# Patient Record
Sex: FEMALE | Race: WHITE
[De-identification: names, ages, dates, MRNs, and addresses within clinical notes are randomized per-mention and may not be internally consistent; named-entity substitution may affect disease eponyms.]

---

## 2018-05-25 ENCOUNTER — RESULT REVIEW (OUTPATIENT)
Age: 23
End: 2018-05-25

## 2019-02-14 ENCOUNTER — APPOINTMENT (OUTPATIENT)
Dept: CARDIOLOGY | Facility: CLINIC | Age: 24
End: 2019-02-14
Payer: COMMERCIAL

## 2019-02-14 ENCOUNTER — NON-APPOINTMENT (OUTPATIENT)
Age: 24
End: 2019-02-14

## 2019-02-14 VITALS
HEIGHT: 62.5 IN | BODY MASS INDEX: 22.79 KG/M2 | HEART RATE: 86 BPM | WEIGHT: 127 LBS | DIASTOLIC BLOOD PRESSURE: 72 MMHG | SYSTOLIC BLOOD PRESSURE: 118 MMHG | OXYGEN SATURATION: 99 %

## 2019-02-14 DIAGNOSIS — Z87.19 PERSONAL HISTORY OF OTHER DISEASES OF THE DIGESTIVE SYSTEM: ICD-10-CM

## 2019-02-14 DIAGNOSIS — R00.2 PALPITATIONS: ICD-10-CM

## 2019-02-14 DIAGNOSIS — Z87.09 PERSONAL HISTORY OF OTHER DISEASES OF THE RESPIRATORY SYSTEM: ICD-10-CM

## 2019-02-14 DIAGNOSIS — Z86.79 PERSONAL HISTORY OF OTHER DISEASES OF THE CIRCULATORY SYSTEM: ICD-10-CM

## 2019-02-14 DIAGNOSIS — Z87.898 PERSONAL HISTORY OF OTHER SPECIFIED CONDITIONS: ICD-10-CM

## 2019-02-14 DIAGNOSIS — Z78.9 OTHER SPECIFIED HEALTH STATUS: ICD-10-CM

## 2019-02-14 PROCEDURE — 99204 OFFICE O/P NEW MOD 45 MIN: CPT | Mod: 25

## 2019-02-14 PROCEDURE — 93000 ELECTROCARDIOGRAM COMPLETE: CPT

## 2019-02-14 RX ORDER — MULTIVITAMIN
TABLET ORAL DAILY
Refills: 0 | Status: ACTIVE | COMMUNITY

## 2019-02-14 NOTE — DISCUSSION/SUMMARY
[FreeTextEntry1] : Pt is a 22 y/o F who presents today with palpitations, tachycardia - ?SVT\par Todays ECG shows NSR without ST-T wave abnormalities - no ectopy\par Will check 30 day event monitor\par Will check transthoracic echocardiogram to evaluate left ventricular function and assess for any structural abnormalities\par will perform ETT to assess patient's current cardiac reserve to incremental activity and check for provocable ECG changes.\par VSS\par The described plan was discussed with the pt.  All questions and concerns were addressed to the best of my knowledge.

## 2019-02-14 NOTE — HISTORY OF PRESENT ILLNESS
[FreeTextEntry1] : Pt is a 24 y/o F who is referred here today by their PCP for evaluation.  She is a nursing student.  \par She is c/o palpitations - she gets high HR when she is at rest - up to 178.  At times feels as if her "HR speeds up then just stops".  She states that this all started summer 2017 when she was taken to the emergency department for "respiratory failure" - suddenly became SOB and passed out.  She was taken to [facility] ED- tells me that the etiology of all this was never discovered and she was discharged from the ED but during this her HR was elevated.  Since then she has noticed these episodes elevated HR that "come out of the blue".  Sometimes associated with chest discomfort.  She denies SOB, diaphoresis, dizziness, syncope, LE edema, PND. \par \par PCP [facility]\par PMH: asthma\par Smoking status: none\par social Etoh\par no drug use \par Current exercise: was regular but since symptoms has not been\par Daily water intake: 1 gallon\par Daily caffeine intake: 3-4 cups coffee\par OTC medications: MVI, probiotics, vit D\par Family hx: no immediate family hx of cardiac disease\par Previous cardiac testing: none\par Previous hospitalizations: respiratory distress, GIB\par no children\par LMP 01/2019

## 2019-02-14 NOTE — REVIEW OF SYSTEMS
[Negative] : Heme/Lymph [see HPI] : see HPI [Shortness Of Breath] : no shortness of breath [Dyspnea on exertion] : not dyspnea during exertion [Chest  Pressure] : no chest pressure [Chest Pain] : chest pain [Lower Ext Edema] : no extremity edema [Leg Claudication] : no intermittent leg claudication [Palpitations] : palpitations

## 2019-03-08 ENCOUNTER — RX CHANGE (OUTPATIENT)
Age: 24
End: 2019-03-08

## 2019-03-18 ENCOUNTER — APPOINTMENT (OUTPATIENT)
Dept: CARDIOLOGY | Facility: CLINIC | Age: 24
End: 2019-03-18
Payer: COMMERCIAL

## 2019-03-18 PROCEDURE — 93306 TTE W/DOPPLER COMPLETE: CPT

## 2019-03-18 PROCEDURE — 93015 CV STRESS TEST SUPVJ I&R: CPT

## 2019-07-08 ENCOUNTER — RX RENEWAL (OUTPATIENT)
Age: 24
End: 2019-07-08

## 2019-07-12 ENCOUNTER — RECORD ABSTRACTING (OUTPATIENT)
Age: 24
End: 2019-07-12

## 2019-07-12 DIAGNOSIS — G47.9 SLEEP DISORDER, UNSPECIFIED: ICD-10-CM

## 2019-07-12 DIAGNOSIS — F41.9 ANXIETY DISORDER, UNSPECIFIED: ICD-10-CM

## 2019-07-12 DIAGNOSIS — Z86.19 PERSONAL HISTORY OF OTHER INFECTIOUS AND PARASITIC DISEASES: ICD-10-CM

## 2019-07-12 DIAGNOSIS — Z80.3 FAMILY HISTORY OF MALIGNANT NEOPLASM OF BREAST: ICD-10-CM

## 2019-07-12 DIAGNOSIS — Z83.3 FAMILY HISTORY OF DIABETES MELLITUS: ICD-10-CM

## 2019-07-12 LAB — CYTOLOGY CVX/VAG DOC THIN PREP: NORMAL

## 2019-07-12 RX ORDER — ZOLPIDEM TARTRATE 5 MG/1
5 TABLET, FILM COATED ORAL
Refills: 0 | Status: ACTIVE | COMMUNITY

## 2019-07-13 ENCOUNTER — APPOINTMENT (OUTPATIENT)
Dept: OBGYN | Facility: CLINIC | Age: 24
End: 2019-07-13
Payer: COMMERCIAL

## 2019-07-13 ENCOUNTER — LABORATORY RESULT (OUTPATIENT)
Age: 24
End: 2019-07-13

## 2019-07-13 VITALS
DIASTOLIC BLOOD PRESSURE: 68 MMHG | HEIGHT: 62.5 IN | WEIGHT: 125 LBS | BODY MASS INDEX: 22.43 KG/M2 | SYSTOLIC BLOOD PRESSURE: 110 MMHG

## 2019-07-13 DIAGNOSIS — Q52.4 OTHER CONGENITAL MALFORMATIONS OF VAGINA: ICD-10-CM

## 2019-07-13 DIAGNOSIS — G44.89 OTHER HEADACHE SYNDROME: ICD-10-CM

## 2019-07-13 DIAGNOSIS — R07.89 OTHER CHEST PAIN: ICD-10-CM

## 2019-07-13 DIAGNOSIS — N89.6 TIGHT HYMENAL RING: ICD-10-CM

## 2019-07-13 DIAGNOSIS — Z00.00 ENCOUNTER FOR GENERAL ADULT MEDICAL EXAMINATION W/OUT ABNORMAL FINDINGS: ICD-10-CM

## 2019-07-13 LAB
BILIRUB UR QL STRIP: NORMAL
CLARITY UR: CLEAR
COLLECTION METHOD: NORMAL
GLUCOSE UR-MCNC: NORMAL
HCG UR QL: 0.2 EU/DL
HGB UR QL STRIP.AUTO: NORMAL
KETONES UR-MCNC: NORMAL
LEUKOCYTE ESTERASE UR QL STRIP: NORMAL
NITRITE UR QL STRIP: NORMAL
PH UR STRIP: 6
PROT UR STRIP-MCNC: NORMAL
SP GR UR STRIP: 1.02

## 2019-07-13 PROCEDURE — 81025 URINE PREGNANCY TEST: CPT

## 2019-07-13 PROCEDURE — 81003 URINALYSIS AUTO W/O SCOPE: CPT | Mod: QW

## 2019-07-13 PROCEDURE — 99395 PREV VISIT EST AGE 18-39: CPT

## 2019-07-13 NOTE — REVIEW OF SYSTEMS
[Feeling Tired] : feeling tired [Abdominal Pain] : abdominal pain [Headache] : headache [Vomiting] : vomiting [Sleep Disturbances] : sleep disturbances [Nl] : Endocrine [FreeTextEntry2] : neck pain

## 2019-07-13 NOTE — PHYSICAL EXAM
[Awake] : awake [Alert] : alert [Soft] : soft [Oriented x3] : oriented to person, place, and time [Labia Majora] : labia major [Labia Minora] : labia minora [Normal] : clitoris [No Bleeding] : there was no active vaginal bleeding [Uterine Adnexae] : were not tender and not enlarged [Acute Distress] : no acute distress [Nipple Discharge] : no nipple discharge [Mass] : no breast mass [Axillary LAD] : no axillary lymphadenopathy [Tender] : non tender [FreeTextEntry6] : normal

## 2019-07-13 NOTE — HISTORY OF PRESENT ILLNESS
[1 Year Ago] : 1 year ago [Good] : being in good health [Healthy Diet] : a healthy diet [Regular Exercise] : regular exercise [Last Pap ___] : Last cervical pap smear was [unfilled] [Menstrual Problems] : reports abnormal menses [Reproductive Age] : is of reproductive age [Menarche Age ____] : age at menarche was [unfilled] [Definite ___ (Date)] : the last menstrual period was [unfilled] [Oral Contraceptive] : uses oral contraception pills [Total Preg ___] : [unfilled] [Pregnancy History] : pregnancy history: [Abortions ___] : [unfilled] [Living ___] : [unfilled] [AB Induced ___] : elective abortions: [unfilled] [Last Screen ___] : last STD screen was [unfilled] [Irregular Menses] : irregular menses [Dyspareunia] : dyspareunia [Definite:  ___ (Date)] : the last menstrual period was [unfilled] [Menarche Age: ____] : age at menarche was [unfilled] [Sexually Active] : is sexually active [Monogamous] : is monogamous [Oral Contraceptives] : uses oral contraceptives [Contraception] : uses contraception [Male ___] : [unfilled] male [NA] : N/A [Weight Concerns] : no concerns with her weight

## 2019-07-13 NOTE — DISCUSSION/SUMMARY
[Condoms] : condom use [Combined Oral Contraception] : combined oral contraception [Pregnancy Prevention] : pregnancy prevention [FreeTextEntry1] : The R/B/C of OBC's reviewed, aware no menses due to hormones.  All the pt's questions and concerns were addressed.

## 2019-07-15 LAB
C TRACH RRNA SPEC QL NAA+PROBE: NOT DETECTED
N GONORRHOEA RRNA SPEC QL NAA+PROBE: NOT DETECTED
SOURCE AMPLIFICATION: NORMAL
SOURCE TP AMPLIFICATION: NORMAL
T VAGINALIS RRNA SPEC QL NAA+PROBE: NOT DETECTED

## 2019-07-18 DIAGNOSIS — N76.1 SUBACUTE AND CHRONIC VAGINITIS: ICD-10-CM

## 2019-07-18 LAB — CYTOLOGY CVX/VAG DOC THIN PREP: ABNORMAL

## 2019-07-31 ENCOUNTER — ASOB RESULT (OUTPATIENT)
Age: 24
End: 2019-07-31

## 2019-07-31 ENCOUNTER — APPOINTMENT (OUTPATIENT)
Dept: OBGYN | Facility: CLINIC | Age: 24
End: 2019-07-31
Payer: COMMERCIAL

## 2019-07-31 PROCEDURE — 76830 TRANSVAGINAL US NON-OB: CPT

## 2020-02-03 ENCOUNTER — APPOINTMENT (OUTPATIENT)
Dept: OBGYN | Facility: CLINIC | Age: 25
End: 2020-02-03

## 2020-02-08 ENCOUNTER — APPOINTMENT (OUTPATIENT)
Dept: OBGYN | Facility: CLINIC | Age: 25
End: 2020-02-08
Payer: COMMERCIAL

## 2020-02-08 VITALS
DIASTOLIC BLOOD PRESSURE: 58 MMHG | HEIGHT: 62 IN | SYSTOLIC BLOOD PRESSURE: 112 MMHG | WEIGHT: 135 LBS | BODY MASS INDEX: 24.84 KG/M2

## 2020-02-08 DIAGNOSIS — Z87.891 PERSONAL HISTORY OF NICOTINE DEPENDENCE: ICD-10-CM

## 2020-02-08 DIAGNOSIS — Z78.9 OTHER SPECIFIED HEALTH STATUS: ICD-10-CM

## 2020-02-08 DIAGNOSIS — R35.0 FREQUENCY OF MICTURITION: ICD-10-CM

## 2020-02-08 DIAGNOSIS — N89.8 OTHER SPECIFIED NONINFLAMMATORY DISORDERS OF VAGINA: ICD-10-CM

## 2020-02-08 DIAGNOSIS — Z82.62 FAMILY HISTORY OF OSTEOPOROSIS: ICD-10-CM

## 2020-02-08 DIAGNOSIS — R30.0 DYSURIA: ICD-10-CM

## 2020-02-08 DIAGNOSIS — Z30.9 ENCOUNTER FOR CONTRACEPTIVE MANAGEMENT, UNSPECIFIED: ICD-10-CM

## 2020-02-08 PROCEDURE — 81003 URINALYSIS AUTO W/O SCOPE: CPT | Mod: QW

## 2020-02-08 PROCEDURE — 99214 OFFICE O/P EST MOD 30 MIN: CPT

## 2020-02-08 RX ORDER — FLUCONAZOLE 150 MG/1
150 TABLET ORAL
Qty: 2 | Refills: 0 | Status: DISCONTINUED | COMMUNITY
Start: 2019-07-18 | End: 2020-02-08

## 2020-02-08 RX ORDER — NORETHINDRONE ACETATE AND ETHINYL ESTRADIOL AND FERROUS FUMARATE 1MG-20(21)
1-20 KIT ORAL
Qty: 3 | Refills: 0 | Status: DISCONTINUED | COMMUNITY
Start: 2019-03-08 | End: 2020-02-08

## 2020-02-08 RX ORDER — NORETHINDRONE ACETATE/ETHINYL ESTRADIOL AND FERROUS FUMARATE 1MG-20(21)
1-20 KIT ORAL
Qty: 3 | Refills: 3 | Status: DISCONTINUED | COMMUNITY
Start: 2019-07-13 | End: 2020-02-08

## 2020-02-08 NOTE — PHYSICAL EXAM
[Labia Majora] : labia major [Labia Minora] : labia minora [Normal] : clitoris [Discharge] : a  ~M vaginal discharge was present [Moderate] : moderate [White] : white

## 2020-02-08 NOTE — REVIEW OF SYSTEMS
[Heart Rate Is Fast] : fast heart rate [Urgency] : urgency [Frequency] : frequency [Burning Sensation] : burning sensation during urination [Nocturia] : nocturia [Sleep Disturbances] : sleep disturbances [Headache] : headache [Libido Decreased] : libido decreased [Nl] : Endocrine [FreeTextEntry1] : PMS, Vaginal Dryness, General poor feeling,  Vertigo, Rashes

## 2020-02-08 NOTE — HISTORY OF PRESENT ILLNESS
[Last Pap ___] : Last cervical pap smear was [unfilled] [Reproductive Age] : is of reproductive age [Oral Contraceptive] : uses oral contraception pills [Total Preg ___] : [unfilled] [Pregnancy History] : pregnancy history: [Living ___] : [unfilled] [AB Spont ___] : miscarriages: [unfilled] [AB Induced ___] : elective abortions: [unfilled] [Definite:  ___ (Date)] : the last menstrual period was [unfilled] [Sexually Active] : is sexually active [Menarche Age: ____] : age at menarche was [unfilled] [Monogamous] : is monogamous [Male ___] : [unfilled] male [Contraception] : uses contraception [Oral Contraceptives] : uses oral contraceptives [Menstrual Problems] : reports normal menses

## 2020-02-10 ENCOUNTER — TRANSCRIPTION ENCOUNTER (OUTPATIENT)
Age: 25
End: 2020-02-10

## 2020-02-10 DIAGNOSIS — B96.89 ACUTE VAGINITIS: ICD-10-CM

## 2020-02-10 DIAGNOSIS — N76.0 ACUTE VAGINITIS: ICD-10-CM

## 2020-02-10 LAB
C TRACH RRNA SPEC QL NAA+PROBE: NOT DETECTED
CANDIDA VAG CYTO: DETECTED
G VAGINALIS+PREV SP MTYP VAG QL MICRO: DETECTED
N GONORRHOEA RRNA SPEC QL NAA+PROBE: NOT DETECTED
SOURCE AMPLIFICATION: NORMAL
T VAGINALIS VAG QL WET PREP: NOT DETECTED

## 2020-03-29 LAB
BILIRUB UR QL STRIP: NORMAL
COLLECTION METHOD: NORMAL
GLUCOSE UR-MCNC: NORMAL
HCG UR QL: 0.2 EU/DL
HGB UR QL STRIP.AUTO: NORMAL
KETONES UR-MCNC: NORMAL
LEUKOCYTE ESTERASE UR QL STRIP: NORMAL
NITRITE UR QL STRIP: NORMAL
PH UR STRIP: 6.5
PROT UR STRIP-MCNC: NORMAL
SP GR UR STRIP: 1.01

## 2020-08-11 ENCOUNTER — RX RENEWAL (OUTPATIENT)
Age: 25
End: 2020-08-11

## 2020-09-05 ENCOUNTER — APPOINTMENT (OUTPATIENT)
Dept: OBGYN | Facility: CLINIC | Age: 25
End: 2020-09-05
Payer: COMMERCIAL

## 2020-09-05 VITALS
DIASTOLIC BLOOD PRESSURE: 62 MMHG | HEIGHT: 62 IN | TEMPERATURE: 97.5 F | BODY MASS INDEX: 24.84 KG/M2 | WEIGHT: 135 LBS | SYSTOLIC BLOOD PRESSURE: 108 MMHG

## 2020-09-05 DIAGNOSIS — Z01.419 ENCOUNTER FOR GYNECOLOGICAL EXAMINATION (GENERAL) (ROUTINE) W/OUT ABNORMAL FINDINGS: ICD-10-CM

## 2020-09-05 DIAGNOSIS — Z87.42 PERSONAL HISTORY OF OTHER DISEASES OF THE FEMALE GENITAL TRACT: ICD-10-CM

## 2020-09-05 DIAGNOSIS — Z87.898 PERSONAL HISTORY OF OTHER SPECIFIED CONDITIONS: ICD-10-CM

## 2020-09-05 DIAGNOSIS — Z30.09 ENCOUNTER FOR OTHER GENERAL COUNSELING AND ADVICE ON CONTRACEPTION: ICD-10-CM

## 2020-09-05 PROCEDURE — 99395 PREV VISIT EST AGE 18-39: CPT

## 2020-09-05 PROCEDURE — 36415 COLL VENOUS BLD VENIPUNCTURE: CPT

## 2020-09-05 RX ORDER — NORGESTIMATE AND ETHINYL ESTRADIOL 7DAYSX3 LO
0.18/0.215/0.25 KIT ORAL
Qty: 1 | Refills: 0 | Status: DISCONTINUED | COMMUNITY
Start: 2020-02-08 | End: 2020-09-05

## 2020-09-05 RX ORDER — METRONIDAZOLE 7.5 MG/G
0.75 GEL VAGINAL
Qty: 1 | Refills: 0 | Status: DISCONTINUED | COMMUNITY
Start: 2020-02-10 | End: 2020-09-05

## 2020-09-05 RX ORDER — FLUCONAZOLE 150 MG/1
150 TABLET ORAL
Qty: 2 | Refills: 0 | Status: DISCONTINUED | COMMUNITY
Start: 2020-02-10 | End: 2020-09-05

## 2020-09-05 NOTE — HISTORY OF PRESENT ILLNESS
[Last Pap ___] : Last cervical pap smear was [unfilled] [1 Year Ago] : 1 year ago [Definite:  ___ (Date)] : the last menstrual period was [unfilled] [Reproductive Age] : is of reproductive age [Sexually Active] : is sexually active [Menarche Age: ____] : age at menarche was [unfilled] [Oral Contraceptives] : uses oral contraceptives [Male ___] : [unfilled] male [Contraception] : uses contraception

## 2020-09-05 NOTE — COUNSELING
[Breast Self Exam] : breast self exam [Nutrition] : nutrition [Exercise] : exercise [Contraception] : contraception [Vitamins/Supplements] : vitamins/supplements [Safe Sexual Practices] : safe sexual practices

## 2020-09-07 LAB
HAV IGM SER QL: NONREACTIVE
HBV CORE IGM SER QL: NONREACTIVE
HBV SURFACE AG SER QL: NONREACTIVE
HCV AB SER QL: NONREACTIVE
HCV S/CO RATIO: 0.08 S/CO
HIV1+2 AB SPEC QL IA.RAPID: NONREACTIVE
T PALLIDUM AB SER QL IA: NEGATIVE

## 2020-09-07 NOTE — REVIEW OF SYSTEMS
[Palpitations] : palpitations [Sleep Disturbances] : sleep disturbances [Anxiety] : anxiety [Nl] : Psychiatric [Fever] : no fever [Chest Pain] : no chest pain [Chills] : no chills [Abdominal Pain] : no abdominal pain [Dyspnea] : no shortness of breath [Cough] : no cough [Vomiting] : no vomiting [Abn Vag Bleeding] : no abnormal vaginal bleeding [Constipation] : no constipation [Pelvic Pain] : no pelvic pain [Breast Pain] : no breast pain [Depression] : no depression [Headache] : no headache

## 2020-09-07 NOTE — PHYSICAL EXAM
[Awake] : awake [Alert] : alert [Soft] : soft [Oriented x3] : oriented to person, place, and time [Labia Majora] : labia major [Normal] : cervix [No Bleeding] : there was no active vaginal bleeding [Pap Obtained] : a Pap smear was performed [Normal Position] : in a normal position [Labia Minora] : labia minora [Uterine Adnexae] : were not tender and not enlarged [Acute Distress] : no acute distress [Thyroid Nodule] : no thyroid nodule [Mass] : no breast mass [LAD] : no lymphadenopathy [Goiter] : no goiter [Axillary LAD] : no axillary lymphadenopathy [Nipple Discharge] : no nipple discharge [Distended] : not distended [Depressed Mood] : not depressed [Flat Affect] : affect not flat [Tender] : non tender [Adnexa Tenderness] : were not tender [Tenderness] : nontender

## 2020-09-09 LAB
C TRACH RRNA SPEC QL NAA+PROBE: NOT DETECTED
N GONORRHOEA RRNA SPEC QL NAA+PROBE: NOT DETECTED
SOURCE TP AMPLIFICATION: NORMAL

## 2020-09-14 ENCOUNTER — TRANSCRIPTION ENCOUNTER (OUTPATIENT)
Age: 25
End: 2020-09-14

## 2020-09-14 LAB
CYTOLOGY CVX/VAG DOC THIN PREP: ABNORMAL
HPV HIGH+LOW RISK DNA PNL CVX: DETECTED

## 2020-10-02 ENCOUNTER — APPOINTMENT (OUTPATIENT)
Dept: OBGYN | Facility: CLINIC | Age: 25
End: 2020-10-02
Payer: COMMERCIAL

## 2020-10-02 DIAGNOSIS — R87.612 LOW GRADE SQUAMOUS INTRAEPITHELIAL LESION ON CYTOLOGIC SMEAR OF CERVIX (LGSIL): ICD-10-CM

## 2020-10-02 DIAGNOSIS — Z23 ENCOUNTER FOR IMMUNIZATION: ICD-10-CM

## 2020-10-02 LAB
HCG UR QL: NEGATIVE
QUALITY CONTROL: YES

## 2020-10-02 PROCEDURE — 57454 BX/CURETT OF CERVIX W/SCOPE: CPT

## 2020-10-02 PROCEDURE — 81025 URINE PREGNANCY TEST: CPT

## 2020-10-02 PROCEDURE — 90471 IMMUNIZATION ADMIN: CPT

## 2020-10-02 PROCEDURE — 90686 IIV4 VACC NO PRSV 0.5 ML IM: CPT

## 2020-10-04 NOTE — PROCEDURE
[Colposcopy] : Colposcopy  [Consent Obtained] : Consent obtained [Risks] : risks [Patient] : patient [LGSIL] : LGSIL [HPV High Risk] : HPV high risk [No Premedication] : no premedication [Colposcopy Adequate] : colposcopy adequate [SCI Fully Visualized] : SCI fully visualized [ECC Performed] : ECC performed [No Abnormalities] : no abnormalities [Lesion] : lesion seen [Biopsy] : biopsy taken [Hemostasis Obtained] : Hemostasis obtained [Tolerated Well] : the patient tolerated the procedure well [de-identified] : 1 [de-identified] : acetowhite at 6 o clock

## 2020-10-09 LAB — CORE LAB BIOPSY: NORMAL

## 2020-11-13 ENCOUNTER — APPOINTMENT (OUTPATIENT)
Dept: OBGYN | Facility: CLINIC | Age: 25
End: 2020-11-13

## 2020-11-13 ENCOUNTER — NON-APPOINTMENT (OUTPATIENT)
Age: 25
End: 2020-11-13

## 2020-11-17 ENCOUNTER — APPOINTMENT (OUTPATIENT)
Dept: OBGYN | Facility: CLINIC | Age: 25
End: 2020-11-17

## 2020-12-02 ENCOUNTER — APPOINTMENT (OUTPATIENT)
Dept: OBGYN | Facility: CLINIC | Age: 25
End: 2020-12-02

## 2020-12-23 PROBLEM — Z01.419 ENCOUNTER FOR ANNUAL ROUTINE GYNECOLOGICAL EXAMINATION: Status: RESOLVED | Noted: 2019-07-13 | Resolved: 2020-12-23

## 2020-12-23 PROBLEM — N76.0 BACTERIAL VAGINOSIS: Status: RESOLVED | Noted: 2020-02-10 | Resolved: 2020-12-23

## 2021-05-19 ENCOUNTER — APPOINTMENT (OUTPATIENT)
Dept: OBGYN | Facility: CLINIC | Age: 26
End: 2021-05-19
Payer: COMMERCIAL

## 2021-05-19 ENCOUNTER — RESULT CHARGE (OUTPATIENT)
Age: 26
End: 2021-05-19

## 2021-05-19 VITALS
HEIGHT: 62 IN | SYSTOLIC BLOOD PRESSURE: 102 MMHG | BODY MASS INDEX: 27.97 KG/M2 | DIASTOLIC BLOOD PRESSURE: 58 MMHG | TEMPERATURE: 97.7 F | WEIGHT: 152 LBS

## 2021-05-19 DIAGNOSIS — R10.2 PELVIC AND PERINEAL PAIN: ICD-10-CM

## 2021-05-19 LAB
BILIRUB UR QL STRIP: NORMAL
GLUCOSE UR-MCNC: NORMAL
HCG UR QL: 0.2 EU/DL
HCG UR QL: NEGATIVE
HGB UR QL STRIP.AUTO: NORMAL
KETONES UR-MCNC: NORMAL
LEUKOCYTE ESTERASE UR QL STRIP: ABNORMAL
NITRITE UR QL STRIP: NORMAL
PH UR STRIP: 5.5
PROT UR STRIP-MCNC: NORMAL
QUALITY CONTROL: YES
SP GR UR STRIP: 1.02

## 2021-05-19 PROCEDURE — 81025 URINE PREGNANCY TEST: CPT

## 2021-05-19 PROCEDURE — 99072 ADDL SUPL MATRL&STAF TM PHE: CPT

## 2021-05-19 PROCEDURE — 81003 URINALYSIS AUTO W/O SCOPE: CPT | Mod: QW

## 2021-05-19 PROCEDURE — 99213 OFFICE O/P EST LOW 20 MIN: CPT

## 2021-05-19 NOTE — PLAN
[FreeTextEntry1] : - differentials for pain reviewed- recommend pelvic US- will return for US and MD consult same day\par - has f/u at [facility] 7/8 for surveillance\par - check cultures - tx based on results\par - pelvic rest for now.

## 2021-05-19 NOTE — HISTORY OF PRESENT ILLNESS
[Condoms] : uses condoms [Y] : Patient is sexually active [Monogamous (Male Partner)] : is monogamous with a male partner [N] : Patient denies prior pregnancies [Menarche Age: ____] : age at menarche was [unfilled] [Currently Active] : currently active [Men] : men [Vaginal] : vaginal [TextBox_4] : Denice is here for evaluation of new onset 'stabbing' pelvic pain with deep penetrating positions with intercourse.  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  She is with the same partner.\par \par She reports during deep penetrating positions she has had very sharp stabbing pain which changes to a cramping sensation- mainly to her LLQ.\par \par She denies n/v/d/fever or chills.\par \par She denies vaginal discharge, odor, dysuria. \par \par  [PapSmeardate] : 9/5/2020 [TextBox_31] : LSIL [HPVDate] : 9/5/2020 [TextBox_78] : POSITIVE [LMPDate] : 4/20/2020 [PGHxAbortions] : 1 [PGHxTotal] : 2 [PGHxABSpont] : 1 [FreeTextEntry1] : 4/20/2020

## 2021-05-19 NOTE — PHYSICAL EXAM
[Appropriately responsive] : appropriately responsive [Alert] : alert [No Acute Distress] : no acute distress [Soft] : soft [Non-tender] : non-tender [Non-distended] : non-distended [No Lesions] : no lesions [No Mass] : no mass [Oriented x3] : oriented x3 [Labia Majora] : normal [Labia Minora] : normal [Discharge] : a  ~M vaginal discharge was present [Moderate] : moderate [Rolly] : yellow [Thick] : thick [Normal] : normal [Uterine Adnexae] : normal [FreeTextEntry5] : no CMT

## 2021-05-21 LAB
BACTERIA UR CULT: NORMAL
C TRACH RRNA SPEC QL NAA+PROBE: NOT DETECTED
N GONORRHOEA RRNA SPEC QL NAA+PROBE: NOT DETECTED
SOURCE AMPLIFICATION: NORMAL

## 2021-05-28 ENCOUNTER — APPOINTMENT (OUTPATIENT)
Dept: OBGYN | Facility: CLINIC | Age: 26
End: 2021-05-28
Payer: COMMERCIAL

## 2021-05-28 ENCOUNTER — ASOB RESULT (OUTPATIENT)
Age: 26
End: 2021-05-28

## 2021-05-28 VITALS
WEIGHT: 152 LBS | HEIGHT: 62 IN | DIASTOLIC BLOOD PRESSURE: 56 MMHG | TEMPERATURE: 97.1 F | SYSTOLIC BLOOD PRESSURE: 94 MMHG | BODY MASS INDEX: 27.97 KG/M2

## 2021-05-28 DIAGNOSIS — N94.10 UNSPECIFIED DYSPAREUNIA: ICD-10-CM

## 2021-05-28 DIAGNOSIS — K59.00 CONSTIPATION, UNSPECIFIED: ICD-10-CM

## 2021-05-28 PROCEDURE — 76830 TRANSVAGINAL US NON-OB: CPT

## 2021-05-28 PROCEDURE — 99072 ADDL SUPL MATRL&STAF TM PHE: CPT

## 2021-05-28 PROCEDURE — 99214 OFFICE O/P EST MOD 30 MIN: CPT | Mod: 25

## 2021-05-28 RX ORDER — NORETHINDRONE ACETATE AND ETHINYL ESTRADIOL AND FERROUS FUMARATE 1.5-30(21)
1.5-3 KIT ORAL
Qty: 84 | Refills: 3 | Status: DISCONTINUED | COMMUNITY
Start: 2020-09-05 | End: 2021-05-28

## 2021-05-31 PROBLEM — N94.10 DYSPAREUNIA, FEMALE: Status: ACTIVE | Noted: 2021-05-19

## 2021-05-31 PROBLEM — K59.00 CONSTIPATION: Status: ACTIVE | Noted: 2021-05-31

## 2021-05-31 NOTE — DISCUSSION/SUMMARY
[FreeTextEntry1] : 25 y/o\par \par 5/28/21 US: anteverted uterus 7 cm. EMS 2 mm. Adnexa normal. No FF. \par \par #Dyspareunia\par #constipation\par #hx of CKC\par -Reviewed pelvic US today, normal\par -intermittent dyspareunia is positional and not likely related to CKC; recommend position changes\par -more likely related bowel; recommend bowel regime\par -con't care at [facility]\par \par \par -Patient will return in 10/2021 for gyn annual exam.  \par

## 2021-05-31 NOTE — HISTORY OF PRESENT ILLNESS
[N] : Patient reports normal menses [Oral Contraceptive] : uses oral contraception pills [Y] : Positive pregnancy history [Menarche Age: ____] : age at menarche was [unfilled] [Currently Active] : currently active [Men] : men [Vaginal] : vaginal [No] : No [Patient refuses STI testing] : Patient refuses STI testing [PapSmeardate] : 9/5/20 [TextBox_4] : pt states coming in for follow up to review sono [TextBox_31] : lsil [HIVDate] : 9/5/20 [TextBox_53] : neg [SyphilisDate] : 9/5/20 [TextBox_58] : neg [GonorrheaDate] : 5/19/21 [TextBox_63] : neg [ChlamydiaDate] : 5/19/21 [HPVDate] : 9/5/20 [TextBox_68] : neg [TextBox_78] : pos [HepatitisBDate] : 9/5/20 [TextBox_83] : neg [HepatitisCDate] : 9/5/20 [TextBox_88] : neg [LMPDate] : 5/18/21 [PGHxTotal] : 2 [[facility] CenterxLiving] : 0 [PGHxABInduced] : 1 [PGHxABSpont] : 1 [FreeTextEntry1] : 5/18/21

## 2021-05-31 NOTE — PHYSICAL EXAM
[Appropriately responsive] : appropriately responsive [Alert] : alert [No Acute Distress] : no acute distress [Soft] : soft [No Lesions] : no lesions [Oriented x3] : oriented x3

## 2021-05-31 NOTE — END OF VISIT
[FreeTextEntry3] : I, Diane edwards acted as scribe for Dr. Denia Mckeon on 05/28/2021. All medical entries made by the Scribe were at my, Dr. Mckeon's, direction and personally dictated by me on 05/28/2021. I have reviewed the chart and agree that the record accurately reflects my personal performance of the history, physical exam, assessment, and plan. I have also personally directed, reviewed, and agreed with the chart.

## 2021-08-07 ENCOUNTER — EMERGENCY (EMERGENCY)
Facility: HOSPITAL | Age: 26
LOS: 0 days | Discharge: HOME | End: 2021-08-07
Attending: EMERGENCY MEDICINE
Payer: COMMERCIAL

## 2021-08-07 VITALS
RESPIRATION RATE: 20 BRPM | SYSTOLIC BLOOD PRESSURE: 123 MMHG | DIASTOLIC BLOOD PRESSURE: 71 MMHG | OXYGEN SATURATION: 100 % | TEMPERATURE: 100 F | HEART RATE: 99 BPM

## 2021-08-07 VITALS — WEIGHT: 126.1 LBS

## 2021-08-07 DIAGNOSIS — R07.9 CHEST PAIN, UNSPECIFIED: ICD-10-CM

## 2021-08-07 DIAGNOSIS — R09.81 NASAL CONGESTION: ICD-10-CM

## 2021-08-07 DIAGNOSIS — J06.9 ACUTE UPPER RESPIRATORY INFECTION, UNSPECIFIED: ICD-10-CM

## 2021-08-07 DIAGNOSIS — Z85.41 PERSONAL HISTORY OF MALIGNANT NEOPLASM OF CERVIX UTERI: ICD-10-CM

## 2021-08-07 DIAGNOSIS — M79.10 MYALGIA, UNSPECIFIED SITE: ICD-10-CM

## 2021-08-07 DIAGNOSIS — B97.89 OTHER VIRAL AGENTS AS THE CAUSE OF DISEASES CLASSIFIED ELSEWHERE: ICD-10-CM

## 2021-08-07 DIAGNOSIS — R51.9 HEADACHE, UNSPECIFIED: ICD-10-CM

## 2021-08-07 DIAGNOSIS — Z86.59 PERSONAL HISTORY OF OTHER MENTAL AND BEHAVIORAL DISORDERS: ICD-10-CM

## 2021-08-07 DIAGNOSIS — R19.7 DIARRHEA, UNSPECIFIED: ICD-10-CM

## 2021-08-07 DIAGNOSIS — Z87.09 PERSONAL HISTORY OF OTHER DISEASES OF THE RESPIRATORY SYSTEM: ICD-10-CM

## 2021-08-07 DIAGNOSIS — R05 COUGH: ICD-10-CM

## 2021-08-07 DIAGNOSIS — Z79.82 LONG TERM (CURRENT) USE OF ASPIRIN: ICD-10-CM

## 2021-08-07 LAB
RAPID RVP RESULT: DETECTED
SARS-COV-2 RNA SPEC QL NAA+PROBE: DETECTED

## 2021-08-07 PROCEDURE — 99283 EMERGENCY DEPT VISIT LOW MDM: CPT

## 2021-08-07 PROCEDURE — 99284 EMERGENCY DEPT VISIT MOD MDM: CPT

## 2021-08-07 PROCEDURE — 0225U NFCT DS DNA&RNA 21 SARSCOV2: CPT

## 2021-08-07 RX ORDER — IBUPROFEN 200 MG
400 TABLET ORAL ONCE
Refills: 0 | Status: COMPLETED | OUTPATIENT
Start: 2021-08-07 | End: 2021-08-07

## 2021-08-07 RX ORDER — ACETAMINOPHEN 500 MG
650 TABLET ORAL ONCE
Refills: 0 | Status: DISCONTINUED | OUTPATIENT
Start: 2021-08-07 | End: 2021-08-07

## 2021-08-07 RX ADMIN — Medication 100 MILLIGRAM(S): at 22:27

## 2021-08-07 RX ADMIN — Medication 400 MILLIGRAM(S): at 22:27

## 2021-08-07 NOTE — ED ADULT TRIAGE NOTE - CHIEF COMPLAINT QUOTE
c/o cough, chest congestion for past 3 days, fever Tmax 100.3, has been taking advil, c/o sick contact from work, patient works as an ER nurse, patient has not taken covid vaccine HX; Respiratory failure at age 21

## 2021-08-07 NOTE — ED PROVIDER NOTE - OBJECTIVE STATEMENT
Patient was provided a ham sandwich at this time       Darcy Rodriguez RN  12/02/19 [id number] Pt. is a 27 yo F with a PMHx of tachycardia and respiratory failure following albuterol administration at age 21, hx of depression and cervical cancer diagnosed in 2020 presents with nasal congestion X 1 day, dry cough X 4 days, burning in throat, headache, and body aches X 4 days.  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  Patient denies fever.  Did not take any meds prior to arrival.  Patient has slight chest tightness and congestion today.  Went to urgent care center and had Rapid Covid swab which was negative.  Takes birth control and anti-depressant.  Denies leg pain, leg swelling, tick bite, rash, vomiting.  +loose stools which is baseline for her.  Patient has not received Covid vaccine.

## 2021-08-07 NOTE — ED ADULT NURSE NOTE - OBJECTIVE STATEMENT
Pt ambulatory to ED for COVID swab with +exposure from work. Pt presents to ED with congestion x1 day, cough x4 days, headache and sensation of throat burning. Pt has not taken any medication for fevers. Pt had rapid COVID test done at urgent care which was negative.

## 2021-08-07 NOTE — ED PROVIDER NOTE - PMH
Depression, unspecified depression type    History of respiratory failure    Malignant neoplasm of cervix, unspecified site

## 2021-08-07 NOTE — ED PROVIDER NOTE - NSFOLLOWUPINSTRUCTIONS_ED_ALL_ED_FT
Take over the counter Dimetapp or Robitussin and take as directed on box.     Use tylenol or ibuprofen 2 tabs every 6 hours for headache or body aches.      Results of your testing should be back within 24 hours.    Viral Respiratory Infection  A viral respiratory infection is an illness that affects parts of the body used for breathing, like the lungs, nose, and throat. It is caused by a germ called a virus.    ImageSome examples of this kind of infection are:    A cold.  The flu (influenza).  A respiratory syncytial virus (RSV) infection.    How do I know if I have this infection?  Most of the time this infection causes:    A stuffy or runny nose.  Yellow or green fluid in the nose.  A cough.  Sneezing.  Tiredness (fatigue).  Achy muscles.  A sore throat.  Sweating or chills.  A fever.  A headache.    How is this infection treated?  If the flu is diagnosed early, it may be treated with an antiviral medicine. This medicine shortens the length of time a person has symptoms. Symptoms may be treated with over-the-counter and prescription medicines, such as:    Expectorants. These make it easier to cough up mucus.  Decongestant nasal sprays.    Doctors do not prescribe antibiotic medicines for viral infections. They do not work with this kind of infection.    How do I know if I should stay home?  To keep others from getting sick, stay home if you have:    A fever.  A lasting cough.  A sore throat.  A runny nose.  Sneezing.  Muscles aches.  Headaches.  Tiredness.  Weakness.  Chills.  Sweating.  An upset stomach (nausea).    Follow these instructions at home:  Rest as much as possible.  Take over-the-counter and prescription medicines only as told by your doctor.  Drink enough fluid to keep your pee (urine) clear or pale yellow.  Gargle with salt water. Do this 3–4 times per day or as needed. To make a salt–water mixture, dissolve ½–1 tsp of salt in 1 cup of warm water. Make sure the salt dissolves all the way.  Use nose drops made from salt water. This helps with stuffiness (congestion). It also helps soften the skin around your nose.  Do not drink alcohol.  Do not use tobacco products, including cigarettes, chewing tobacco, and e-cigarettes. If you need help quitting, ask your doctor.  Get help if:  Your symptoms last for 10 days or longer.  Your symptoms get worse over time.  You have a fever.  You have very bad pain in your face or forehead.  Parts of your jaw or neck become very swollen.  Get help right away if:  You feel pain or pressure in your chest.  You have shortness of breath.  You faint or feel like you will faint.  You keep throwing up (vomiting).  You feel confused.  This information is not intended to replace advice given to you by your health care provider. Make sure you discuss any questions you have with your health care provider.

## 2021-08-07 NOTE — ED PROVIDER NOTE - PATIENT PORTAL LINK FT
You can access the FollowMyHealth Patient Portal offered by [facility] by registering at the following website: [email or web address]. By joining [facility]’s FollowMyHealth portal, you will also be able to view your health information using other applications (apps) compatible with our system.

## 2021-08-07 NOTE — ED PROVIDER NOTE - CLINICAL SUMMARY MEDICAL DECISION MAKING FREE TEXT BOX
Viral URI.  RVP/Covid testing (PCR) sent.      Tylenol for headache and body aches, oral hydration, and tessalon for cough given in ED.

## 2021-08-09 NOTE — ED POST DISCHARGE NOTE - DETAILS
COVID positive.  Unable to leave message.  Jerson KHAN COVID positive.  Unable to leave message. Letter sent. ~Donavon Tariq PA-C

## 2021-08-13 ENCOUNTER — INPATIENT (INPATIENT)
Facility: HOSPITAL | Age: 26
LOS: 1 days | Discharge: HOME | DRG: 178 | End: 2021-08-15
Attending: FAMILY MEDICINE | Admitting: INTERNAL MEDICINE
Payer: COMMERCIAL

## 2021-08-13 VITALS — HEART RATE: 136 BPM | RESPIRATION RATE: 25 BRPM | OXYGEN SATURATION: 100 %

## 2021-08-13 LAB
ALBUMIN SERPL ELPH-MCNC: 4.2 G/DL — SIGNIFICANT CHANGE UP (ref 3.3–5)
ALP SERPL-CCNC: 83 U/L — SIGNIFICANT CHANGE UP (ref 40–120)
ALT FLD-CCNC: 60 U/L — SIGNIFICANT CHANGE UP (ref 12–78)
ANION GAP SERPL CALC-SCNC: 12 MMOL/L — SIGNIFICANT CHANGE UP (ref 5–17)
AST SERPL-CCNC: 46 U/L — HIGH (ref 15–37)
BASOPHILS # BLD AUTO: 0.03 K/UL — SIGNIFICANT CHANGE UP (ref 0–0.2)
BASOPHILS NFR BLD AUTO: 0.4 % — SIGNIFICANT CHANGE UP (ref 0–2)
BILIRUB SERPL-MCNC: 0.5 MG/DL — SIGNIFICANT CHANGE UP (ref 0.2–1.2)
BUN SERPL-MCNC: 5 MG/DL — LOW (ref 7–23)
CALCIUM SERPL-MCNC: 9.7 MG/DL — SIGNIFICANT CHANGE UP (ref 8.5–10.1)
CHLORIDE SERPL-SCNC: 111 MMOL/L — HIGH (ref 96–108)
CO2 SERPL-SCNC: 18 MMOL/L — LOW (ref 22–31)
CREAT SERPL-MCNC: 0.9 MG/DL — SIGNIFICANT CHANGE UP (ref 0.5–1.3)
EOSINOPHIL # BLD AUTO: 0.06 K/UL — SIGNIFICANT CHANGE UP (ref 0–0.5)
EOSINOPHIL NFR BLD AUTO: 0.8 % — SIGNIFICANT CHANGE UP (ref 0–6)
GLUCOSE SERPL-MCNC: 89 MG/DL — SIGNIFICANT CHANGE UP (ref 70–99)
HCT VFR BLD CALC: 38.8 % — SIGNIFICANT CHANGE UP (ref 34.5–45)
HGB BLD-MCNC: 13.5 G/DL — SIGNIFICANT CHANGE UP (ref 11.5–15.5)
IMM GRANULOCYTES NFR BLD AUTO: 0.3 % — SIGNIFICANT CHANGE UP (ref 0–1.5)
INR BLD: 1 RATIO — SIGNIFICANT CHANGE UP (ref 0.88–1.16)
LYMPHOCYTES # BLD AUTO: 3.18 K/UL — SIGNIFICANT CHANGE UP (ref 1–3.3)
LYMPHOCYTES # BLD AUTO: 42.5 % — SIGNIFICANT CHANGE UP (ref 13–44)
MCHC RBC-ENTMCNC: 31.1 PG — SIGNIFICANT CHANGE UP (ref 27–34)
MCHC RBC-ENTMCNC: 34.8 GM/DL — SIGNIFICANT CHANGE UP (ref 32–36)
MCV RBC AUTO: 89.4 FL — SIGNIFICANT CHANGE UP (ref 80–100)
MONOCYTES # BLD AUTO: 0.58 K/UL — SIGNIFICANT CHANGE UP (ref 0–0.9)
MONOCYTES NFR BLD AUTO: 7.7 % — SIGNIFICANT CHANGE UP (ref 2–14)
NEUTROPHILS # BLD AUTO: 3.62 K/UL — SIGNIFICANT CHANGE UP (ref 1.8–7.4)
NEUTROPHILS NFR BLD AUTO: 48.3 % — SIGNIFICANT CHANGE UP (ref 43–77)
NT-PROBNP SERPL-SCNC: 79 PG/ML — SIGNIFICANT CHANGE UP (ref 0–125)
PLATELET # BLD AUTO: 284 K/UL — SIGNIFICANT CHANGE UP (ref 150–400)
POTASSIUM SERPL-MCNC: 4.3 MMOL/L — SIGNIFICANT CHANGE UP (ref 3.5–5.3)
POTASSIUM SERPL-SCNC: 4.3 MMOL/L — SIGNIFICANT CHANGE UP (ref 3.5–5.3)
PROT SERPL-MCNC: 8.2 GM/DL — SIGNIFICANT CHANGE UP (ref 6–8.3)
PROTHROM AB SERPL-ACNC: 11.6 SEC — SIGNIFICANT CHANGE UP (ref 10.6–13.6)
RBC # BLD: 4.34 M/UL — SIGNIFICANT CHANGE UP (ref 3.8–5.2)
RBC # FLD: 12.4 % — SIGNIFICANT CHANGE UP (ref 10.3–14.5)
SODIUM SERPL-SCNC: 141 MMOL/L — SIGNIFICANT CHANGE UP (ref 135–145)
TROPONIN I SERPL-MCNC: <0.015 NG/ML — SIGNIFICANT CHANGE UP (ref 0.01–0.04)
WBC # BLD: 7.49 K/UL — SIGNIFICANT CHANGE UP (ref 3.8–10.5)
WBC # FLD AUTO: 7.49 K/UL — SIGNIFICANT CHANGE UP (ref 3.8–10.5)

## 2021-08-13 PROCEDURE — 99285 EMERGENCY DEPT VISIT HI MDM: CPT

## 2021-08-13 PROCEDURE — 93010 ELECTROCARDIOGRAM REPORT: CPT

## 2021-08-13 RX ORDER — ONDANSETRON 8 MG/1
4 TABLET, FILM COATED ORAL ONCE
Refills: 0 | Status: COMPLETED | OUTPATIENT
Start: 2021-08-13 | End: 2021-08-13

## 2021-08-13 RX ORDER — REMDESIVIR 5 MG/ML
200 INJECTION INTRAVENOUS EVERY 24 HOURS
Refills: 0 | Status: COMPLETED | OUTPATIENT
Start: 2021-08-13 | End: 2021-08-13

## 2021-08-13 RX ORDER — SODIUM CHLORIDE 9 MG/ML
1000 INJECTION INTRAMUSCULAR; INTRAVENOUS; SUBCUTANEOUS ONCE
Refills: 0 | Status: COMPLETED | OUTPATIENT
Start: 2021-08-13 | End: 2021-08-13

## 2021-08-13 RX ORDER — MORPHINE SULFATE 50 MG/1
4 CAPSULE, EXTENDED RELEASE ORAL ONCE
Refills: 0 | Status: DISCONTINUED | OUTPATIENT
Start: 2021-08-13 | End: 2021-08-13

## 2021-08-13 RX ORDER — REMDESIVIR 5 MG/ML
100 INJECTION INTRAVENOUS EVERY 24 HOURS
Refills: 0 | Status: DISCONTINUED | OUTPATIENT
Start: 2021-08-14 | End: 2021-08-14

## 2021-08-13 RX ORDER — MAGNESIUM SULFATE 500 MG/ML
2 VIAL (ML) INJECTION ONCE
Refills: 0 | Status: COMPLETED | OUTPATIENT
Start: 2021-08-13 | End: 2021-08-13

## 2021-08-13 RX ORDER — REMDESIVIR 5 MG/ML
INJECTION INTRAVENOUS
Refills: 0 | Status: DISCONTINUED | OUTPATIENT
Start: 2021-08-13 | End: 2021-08-14

## 2021-08-13 RX ADMIN — MORPHINE SULFATE 4 MILLIGRAM(S): 50 CAPSULE, EXTENDED RELEASE ORAL at 23:29

## 2021-08-13 RX ADMIN — Medication 125 MILLIGRAM(S): at 22:45

## 2021-08-13 RX ADMIN — Medication 50 GRAM(S): at 22:45

## 2021-08-13 RX ADMIN — MORPHINE SULFATE 4 MILLIGRAM(S): 50 CAPSULE, EXTENDED RELEASE ORAL at 23:59

## 2021-08-13 RX ADMIN — ONDANSETRON 4 MILLIGRAM(S): 8 TABLET, FILM COATED ORAL at 23:43

## 2021-08-13 RX ADMIN — SODIUM CHLORIDE 1000 MILLILITER(S): 9 INJECTION INTRAMUSCULAR; INTRAVENOUS; SUBCUTANEOUS at 23:05

## 2021-08-13 RX ADMIN — REMDESIVIR 580 MILLIGRAM(S): 5 INJECTION INTRAVENOUS at 23:30

## 2021-08-13 NOTE — ED ADULT NURSE NOTE - OBJECTIVE STATEMENT
Pt A&Ox4, presents to the ED c/o SOB and cough. Pt COVID positive on 8/7. Pt reports symptoms starting last week on 8/4; fever, blurred vision, fatigue, loss of taste and smell. Pt states fever broke last night. Pt noted to be diaphoretic, tachypneic, afebrile, and tachycardic upon assessment, coughing uncontrollably. Unable to obtain accurate BP due to coughing. Pt reports 35lb weight loss recently. Denies CP.

## 2021-08-13 NOTE — ED PROVIDER NOTE - OBJECTIVE STATEMENT
25 y/o female in ED c/o worsening sob and cough x 1 day.   pt states recently diagnosed with COVID.   states not vaccinated.    positive sick contacts at work with COVID.  pt states also with cp with coughing.    pt denies any v/d/abd pain.   tolerating PO,

## 2021-08-13 NOTE — ED ADULT TRIAGE NOTE - CHIEF COMPLAINT QUOTE
Pt presents to ED for coughing, shortness of breath. pt is COVID positive. Pt presents to ED for shortness of breath, coughing. unable to speak in complete sentences. pt is COVID positive 8/7.

## 2021-08-13 NOTE — ED PROVIDER NOTE - NSICDXPASTMEDICALHX_GEN_ALL_CORE_FT
PAST MEDICAL HISTORY:  Depression, unspecified depression type     History of respiratory failure     Malignant neoplasm of cervix, unspecified site

## 2021-08-13 NOTE — ED ADULT NURSE NOTE - CHIEF COMPLAINT QUOTE
Pt presents to ED for shortness of breath, coughing. unable to speak in complete sentences. pt is COVID positive 8/7.

## 2021-08-13 NOTE — ED PROVIDER NOTE - CARE PLAN
1 Principal Discharge DX:	2019 novel coronavirus disease (COVID-19)  Secondary Diagnosis:	Shortness of breath

## 2021-08-14 ENCOUNTER — RESULT REVIEW (OUTPATIENT)
Age: 26
End: 2021-08-14

## 2021-08-14 DIAGNOSIS — U07.1 COVID-19: ICD-10-CM

## 2021-08-14 PROBLEM — F32.9 MAJOR DEPRESSIVE DISORDER, SINGLE EPISODE, UNSPECIFIED: Chronic | Status: ACTIVE | Noted: 2021-08-07

## 2021-08-14 PROBLEM — C53.9 MALIGNANT NEOPLASM OF CERVIX UTERI, UNSPECIFIED: Chronic | Status: ACTIVE | Noted: 2021-08-07

## 2021-08-14 PROBLEM — Z87.09 PERSONAL HISTORY OF OTHER DISEASES OF THE RESPIRATORY SYSTEM: Chronic | Status: ACTIVE | Noted: 2021-08-07

## 2021-08-14 LAB
A1C WITH ESTIMATED AVERAGE GLUCOSE RESULT: 5.1 % — SIGNIFICANT CHANGE UP (ref 4–5.6)
ALBUMIN SERPL ELPH-MCNC: 3.7 G/DL — SIGNIFICANT CHANGE UP (ref 3.3–5)
ALBUMIN SERPL ELPH-MCNC: 3.7 G/DL — SIGNIFICANT CHANGE UP (ref 3.3–5)
ALP SERPL-CCNC: 75 U/L — SIGNIFICANT CHANGE UP (ref 40–120)
ALP SERPL-CCNC: 75 U/L — SIGNIFICANT CHANGE UP (ref 40–120)
ALT FLD-CCNC: 54 U/L — SIGNIFICANT CHANGE UP (ref 12–78)
ALT FLD-CCNC: 57 U/L — SIGNIFICANT CHANGE UP (ref 12–78)
ANION GAP SERPL CALC-SCNC: 6 MMOL/L — SIGNIFICANT CHANGE UP (ref 5–17)
AST SERPL-CCNC: 44 U/L — HIGH (ref 15–37)
AST SERPL-CCNC: 44 U/L — HIGH (ref 15–37)
BASE EXCESS BLDA CALC-SCNC: -4.9 MMOL/L — LOW (ref -2–2)
BILIRUB DIRECT SERPL-MCNC: 0.1 MG/DL — SIGNIFICANT CHANGE UP (ref 0–0.2)
BILIRUB INDIRECT FLD-MCNC: 0.4 MG/DL — SIGNIFICANT CHANGE UP (ref 0.2–1)
BILIRUB SERPL-MCNC: 0.4 MG/DL — SIGNIFICANT CHANGE UP (ref 0.2–1.2)
BILIRUB SERPL-MCNC: 0.5 MG/DL — SIGNIFICANT CHANGE UP (ref 0.2–1.2)
BLOOD GAS COMMENTS ARTERIAL: SIGNIFICANT CHANGE UP
BUN SERPL-MCNC: 6 MG/DL — LOW (ref 7–23)
CALCIUM SERPL-MCNC: 8.3 MG/DL — LOW (ref 8.5–10.1)
CHLORIDE SERPL-SCNC: 112 MMOL/L — HIGH (ref 96–108)
CK SERPL-CCNC: 76 U/L — SIGNIFICANT CHANGE UP (ref 26–192)
CO2 SERPL-SCNC: 21 MMOL/L — LOW (ref 22–31)
COVID-19 SPIKE DOMAIN AB INTERP: NEGATIVE — SIGNIFICANT CHANGE UP
COVID-19 SPIKE DOMAIN ANTIBODY RESULT: 0.4 U/ML — SIGNIFICANT CHANGE UP
CREAT SERPL-MCNC: 0.54 MG/DL — SIGNIFICANT CHANGE UP (ref 0.5–1.3)
CREAT SERPL-MCNC: 0.78 MG/DL — SIGNIFICANT CHANGE UP (ref 0.5–1.3)
CRP SERPL-MCNC: 12 MG/L — HIGH
D DIMER BLD IA.RAPID-MCNC: 319 NG/ML DDU — HIGH
ESTIMATED AVERAGE GLUCOSE: 100 MG/DL — SIGNIFICANT CHANGE UP (ref 68–114)
FERRITIN SERPL-MCNC: 254 NG/ML — HIGH (ref 15–150)
GAS PNL BLDA: SIGNIFICANT CHANGE UP
GLUCOSE SERPL-MCNC: 138 MG/DL — HIGH (ref 70–99)
HCO3 BLDA-SCNC: 13 MMOL/L — LOW (ref 21–29)
HCT VFR BLD CALC: 33.9 % — LOW (ref 34.5–45)
HGB BLD-MCNC: 11.8 G/DL — SIGNIFICANT CHANGE UP (ref 11.5–15.5)
INR BLD: 0.99 RATIO — SIGNIFICANT CHANGE UP (ref 0.88–1.16)
LACTATE SERPL-SCNC: 1.2 MMOL/L — SIGNIFICANT CHANGE UP (ref 0.7–2)
LDH SERPL L TO P-CCNC: 213 U/L — SIGNIFICANT CHANGE UP (ref 84–241)
MAGNESIUM SERPL-MCNC: 2.5 MG/DL — SIGNIFICANT CHANGE UP (ref 1.6–2.6)
MCHC RBC-ENTMCNC: 31.5 PG — SIGNIFICANT CHANGE UP (ref 27–34)
MCHC RBC-ENTMCNC: 34.8 GM/DL — SIGNIFICANT CHANGE UP (ref 32–36)
MCV RBC AUTO: 90.4 FL — SIGNIFICANT CHANGE UP (ref 80–100)
PCO2 BLDA: 12 MMHG — LOW (ref 32–46)
PH BLDA: 7.67 — CRITICAL HIGH (ref 7.35–7.45)
PHOSPHATE SERPL-MCNC: 2.7 MG/DL — SIGNIFICANT CHANGE UP (ref 2.5–4.5)
PLATELET # BLD AUTO: 216 K/UL — SIGNIFICANT CHANGE UP (ref 150–400)
PO2 BLDA: 156 MMHG — HIGH (ref 74–108)
POTASSIUM SERPL-MCNC: 4 MMOL/L — SIGNIFICANT CHANGE UP (ref 3.5–5.3)
POTASSIUM SERPL-SCNC: 4 MMOL/L — SIGNIFICANT CHANGE UP (ref 3.5–5.3)
PROCALCITONIN SERPL-MCNC: 0.03 NG/ML — SIGNIFICANT CHANGE UP (ref 0.02–0.1)
PROT SERPL-MCNC: 7.3 GM/DL — SIGNIFICANT CHANGE UP (ref 6–8.3)
PROT SERPL-MCNC: 7.3 GM/DL — SIGNIFICANT CHANGE UP (ref 6–8.3)
PROTHROM AB SERPL-ACNC: 11.5 SEC — SIGNIFICANT CHANGE UP (ref 10.6–13.6)
RBC # BLD: 3.75 M/UL — LOW (ref 3.8–5.2)
RBC # FLD: 12.5 % — SIGNIFICANT CHANGE UP (ref 10.3–14.5)
SAO2 % BLDA: 99 % — HIGH (ref 92–96)
SARS-COV-2 IGG+IGM SERPL QL IA: 0.4 U/ML — SIGNIFICANT CHANGE UP
SARS-COV-2 IGG+IGM SERPL QL IA: NEGATIVE — SIGNIFICANT CHANGE UP
SODIUM SERPL-SCNC: 139 MMOL/L — SIGNIFICANT CHANGE UP (ref 135–145)
TROPONIN I SERPL-MCNC: <0.015 NG/ML — SIGNIFICANT CHANGE UP (ref 0.01–0.04)
WBC # BLD: 3.36 K/UL — LOW (ref 3.8–10.5)
WBC # FLD AUTO: 3.36 K/UL — LOW (ref 3.8–10.5)

## 2021-08-14 PROCEDURE — 71275 CT ANGIOGRAPHY CHEST: CPT | Mod: 26

## 2021-08-14 PROCEDURE — 85379 FIBRIN DEGRADATION QUANT: CPT

## 2021-08-14 PROCEDURE — 84100 ASSAY OF PHOSPHORUS: CPT

## 2021-08-14 PROCEDURE — 83605 ASSAY OF LACTIC ACID: CPT

## 2021-08-14 PROCEDURE — 71275 CT ANGIOGRAPHY CHEST: CPT

## 2021-08-14 PROCEDURE — 82728 ASSAY OF FERRITIN: CPT

## 2021-08-14 PROCEDURE — 80053 COMPREHEN METABOLIC PANEL: CPT

## 2021-08-14 PROCEDURE — 88305 TISSUE EXAM BY PATHOLOGIST: CPT

## 2021-08-14 PROCEDURE — 83615 LACTATE (LD) (LDH) ENZYME: CPT

## 2021-08-14 PROCEDURE — 36415 COLL VENOUS BLD VENIPUNCTURE: CPT

## 2021-08-14 PROCEDURE — 82550 ASSAY OF CK (CPK): CPT

## 2021-08-14 PROCEDURE — 12345: CPT | Mod: NC,GC

## 2021-08-14 PROCEDURE — 99222 1ST HOSP IP/OBS MODERATE 55: CPT | Mod: GC

## 2021-08-14 PROCEDURE — 86769 SARS-COV-2 COVID-19 ANTIBODY: CPT

## 2021-08-14 PROCEDURE — 84484 ASSAY OF TROPONIN QUANT: CPT

## 2021-08-14 PROCEDURE — 85027 COMPLETE CBC AUTOMATED: CPT

## 2021-08-14 PROCEDURE — 93010 ELECTROCARDIOGRAM REPORT: CPT

## 2021-08-14 PROCEDURE — 84145 PROCALCITONIN (PCT): CPT

## 2021-08-14 PROCEDURE — 83735 ASSAY OF MAGNESIUM: CPT

## 2021-08-14 PROCEDURE — 71045 X-RAY EXAM CHEST 1 VIEW: CPT | Mod: 26

## 2021-08-14 PROCEDURE — 86140 C-REACTIVE PROTEIN: CPT

## 2021-08-14 PROCEDURE — 83036 HEMOGLOBIN GLYCOSYLATED A1C: CPT

## 2021-08-14 PROCEDURE — 88305 TISSUE EXAM BY PATHOLOGIST: CPT | Mod: 26

## 2021-08-14 RX ORDER — ENOXAPARIN SODIUM 100 MG/ML
40 INJECTION SUBCUTANEOUS
Refills: 0 | Status: DISCONTINUED | OUTPATIENT
Start: 2021-08-14 | End: 2021-08-14

## 2021-08-14 RX ORDER — ALPRAZOLAM 0.25 MG
0.25 TABLET ORAL DAILY
Refills: 0 | Status: DISCONTINUED | OUTPATIENT
Start: 2021-08-14 | End: 2021-08-15

## 2021-08-14 RX ORDER — BUPROPION HYDROCHLORIDE 150 MG/1
150 TABLET, EXTENDED RELEASE ORAL DAILY
Refills: 0 | Status: DISCONTINUED | OUTPATIENT
Start: 2021-08-14 | End: 2021-08-15

## 2021-08-14 RX ORDER — ONDANSETRON 8 MG/1
4 TABLET, FILM COATED ORAL EVERY 6 HOURS
Refills: 0 | Status: DISCONTINUED | OUTPATIENT
Start: 2021-08-14 | End: 2021-08-15

## 2021-08-14 RX ORDER — SODIUM CHLORIDE 9 MG/ML
1000 INJECTION INTRAMUSCULAR; INTRAVENOUS; SUBCUTANEOUS
Refills: 0 | Status: DISCONTINUED | OUTPATIENT
Start: 2021-08-14 | End: 2021-08-15

## 2021-08-14 RX ORDER — ENOXAPARIN SODIUM 100 MG/ML
40 INJECTION SUBCUTANEOUS DAILY
Refills: 0 | Status: DISCONTINUED | OUTPATIENT
Start: 2021-08-14 | End: 2021-08-14

## 2021-08-14 RX ORDER — ZOLPIDEM TARTRATE 10 MG/1
5 TABLET ORAL AT BEDTIME
Refills: 0 | Status: DISCONTINUED | OUTPATIENT
Start: 2021-08-14 | End: 2021-08-15

## 2021-08-14 RX ORDER — ACETAMINOPHEN 500 MG
650 TABLET ORAL EVERY 4 HOURS
Refills: 0 | Status: DISCONTINUED | OUTPATIENT
Start: 2021-08-14 | End: 2021-08-15

## 2021-08-14 RX ORDER — ASPIRIN/CALCIUM CARB/MAGNESIUM 324 MG
81 TABLET ORAL DAILY
Refills: 0 | Status: DISCONTINUED | OUTPATIENT
Start: 2021-08-14 | End: 2021-08-15

## 2021-08-14 RX ORDER — ENOXAPARIN SODIUM 100 MG/ML
40 INJECTION SUBCUTANEOUS DAILY
Refills: 0 | Status: DISCONTINUED | OUTPATIENT
Start: 2021-08-14 | End: 2021-08-15

## 2021-08-14 RX ADMIN — Medication 0.25 MILLIGRAM(S): at 20:01

## 2021-08-14 RX ADMIN — BUPROPION HYDROCHLORIDE 150 MILLIGRAM(S): 150 TABLET, EXTENDED RELEASE ORAL at 11:58

## 2021-08-14 RX ADMIN — Medication 1 TABLET(S): at 11:57

## 2021-08-14 RX ADMIN — SODIUM CHLORIDE 75 MILLILITER(S): 9 INJECTION INTRAMUSCULAR; INTRAVENOUS; SUBCUTANEOUS at 14:14

## 2021-08-14 RX ADMIN — Medication 100 MILLIGRAM(S): at 05:22

## 2021-08-14 RX ADMIN — ENOXAPARIN SODIUM 40 MILLIGRAM(S): 100 INJECTION SUBCUTANEOUS at 11:57

## 2021-08-14 RX ADMIN — ZOLPIDEM TARTRATE 5 MILLIGRAM(S): 10 TABLET ORAL at 22:26

## 2021-08-14 RX ADMIN — Medication 100 MILLIGRAM(S): at 14:07

## 2021-08-14 RX ADMIN — Medication 81 MILLIGRAM(S): at 11:56

## 2021-08-14 RX ADMIN — Medication 100 MILLIGRAM(S): at 21:07

## 2021-08-14 NOTE — ED ADULT NURSE REASSESSMENT NOTE - NS ED NURSE REASSESS COMMENT FT1
Pt A&Ox4, resting comfortably. Denies any pain. VSS, afebrile. Respirations equal and unlabored. Dry cough noted. Morning medication administered. Report given to [address] [place] [address]. Pt pending transport. Pt updated on plan of care, verbalized understanding.

## 2021-08-14 NOTE — CONSULT NOTE ADULT - ASSESSMENT
26F with a PMH significant for Cervical Cancer and ALECIA/MDD admitted on 8/14 for evaluation of cough and shortness of breath, fever and chills; was diagnosed with COVID-19 infection about one week ago. On the "advice" of her oncologist she never was vaccinated. Has known sick contact with COVID-19 infection. Has not required any supplemental oxygen and saturation is 100% on room air.   1. Patient is admitted with COVID-19 infection, however is not hypoxic, has cough, but is in midst of discontinuing smoking  - follow up cultures   - serial cbc and monitor temperature   - reviewed prior medical records to evaluate for resistant or atypical pathogens   - no role for steroids or remdesivir at this time, should she require supplemental oxygen, then reconsider  - suggest discharge home  2. other issues: per medicine 26F with a PMH significant for Cervical Cancer and ALECIA/MDD admitted on 8/14 for evaluation of cough and shortness of breath, fever and chills; was diagnosed with COVID-19 infection about one week ago. On the "advice" of her oncologist she never was vaccinated. Has known sick contact with COVID-19 infection. Has not required any supplemental oxygen and saturation is 100% on room air.   1. Patient is admitted with COVID-19 infection, however is not hypoxic, has cough, but is in midst of discontinuing smoking  - follow up cultures   - leukopenia secondary to viral infection  - serial cbc and monitor temperature   - reviewed prior medical records to evaluate for resistant or atypical pathogens   - no role for steroids or remdesivir at this time, should she require supplemental oxygen, then reconsider  - suggest discharge home  2. other issues: per medicine

## 2021-08-14 NOTE — PROGRESS NOTE ADULT - SUBJECTIVE AND OBJECTIVE BOX
HPI  26F with a PMH significant for Cervical Cancer and ALECIA/MDD who presents [facility] with progressive shortness of breath. She was recently diagnosed with COVID on Saturday night, and was quarantining while treating herself with conservative management for symptomatic relief. At that time she experienced fevers, chills with severe Myalgias. She is an active smoker of over 2 years and recently quit smoking a week prior where her sxs started. She began experiencing progressive difficulties with her respiration as well as associated pleuritic chest pain, which concerned her to report to the ED for further managment. She endorses one positive contact at her employment and states that she is currently unvaccinated.     Subjective  No acute distress. Vitals stable. Pt states she feels better today though still has some residual coughing and minimal sputum production. Denies SOB, difficulty breathing, chest pain.      REVIEW OF SYSTEMS:  CONSTITUTIONAL: No weakness, fevers or chills  EYES/ENT: No visual changes;  No vertigo or throat pain   NECK: No pain or stiffness  RESPIRATORY: No cough, wheezing, hemoptysis; No shortness of breath  CARDIOVASCULAR: No chest pain or palpitations  GASTROINTESTINAL: No abdominal or epigastric pain. No nausea, vomiting, or hematemesis; No diarrhea or constipation. No melena or hematochezia.  GENITOURINARY: No dysuria, frequency or hematuria  NEUROLOGICAL: No numbness or weakness  SKIN: No itching, burning, rashes, or lesions   All other review of systems is negative unless indicated above    PHYSICAL EXAM:  Vital Signs Last 24 Hrs  T(C): 36.7 (14 Aug 2021 09:02), Max: 37.3 (14 Aug 2021 06:53)  T(F): 98.1 (14 Aug 2021 09:02), Max: 99.2 (14 Aug 2021 06:53)  HR: 68 (14 Aug 2021 09:02) (68 - 136)  BP: 107/57 (14 Aug 2021 09:02) (98/73 - 140/75)  BP(mean): 92 (14 Aug 2021 05:22) (73 - 107)  RR: 19 (14 Aug 2021 09:02) (18 - 28)  SpO2: 100% (14 Aug 2021 09:02) (95% - 100%)    Constitutional: Pt lying in bed, awake and alert, NAD  HEENT: EOMI, normal hearing, moist mucous membranes  Neck: Soft and supple, no JVD  Respiratory: CTABL, No wheezing, rales or rhonchi  Cardiovascular: S1S2+, RRR, no M/G/R  Gastrointestinal: BS+, soft, NT/ND, no guarding, no rebound  Extremities: No peripheral edema  Vascular: 2+ peripheral pulses  Neurological: AAOx3, no focal deficits  Musculoskeletal: 5/5 strength b/l upper and lower extremities  Skin: No rashes    MEDICATIONS:  MEDICATIONS  (STANDING):  aspirin  chewable 81 milliGRAM(s) Oral daily  benzonatate 100 milliGRAM(s) Oral three times a day  buPROPion XL (24-Hour) . 150 milliGRAM(s) Oral daily  enoxaparin Injectable 40 milliGRAM(s) SubCutaneous daily  hydrocodone/homatropine Syrup 5 milliLiter(s) Oral three times a day  multivitamin 1 Tablet(s) Oral daily  remdesivir  IVPB 100 milliGRAM(s) IV Intermittent every 24 hours  remdesivir  IVPB   IV Intermittent   sodium chloride 0.9%. 1000 milliLiter(s) (75 mL/Hr) IV Continuous <Continuous>      LABS: All Labs Reviewed:                        11.8   3.36  )-----------( 216      ( 14 Aug 2021 08:05 )             33.9     08-14    139  |  112<H>  |  6<L>  ----------------------------<  138<H>  4.0   |  21<L>  |  0.54    Ca    8.3<L>      14 Aug 2021 08:05  Phos  2.7     08-14  Mg     2.5     08-14    TPro  7.3  /  Alb  3.7  /  TBili  0.4  /  DBili  x   /  AST  44<H>  /  ALT  57  /  AlkPhos  75  08-14    PT/INR - ( 14 Aug 2021 01:21 )   PT: 11.5 sec;   INR: 0.99 ratio           CARDIAC MARKERS ( 14 Aug 2021 08:05 )  <0.015 ng/mL / x     / 76 U/L / x     / x      CARDIAC MARKERS ( 13 Aug 2021 22:35 )  <0.015 ng/mL / x     / x     / x     / x          Blood Culture:   I&O's Summary    CAPILLARY BLOOD GLUCOSE          RADIOLOGY/EKG:

## 2021-08-14 NOTE — H&P ADULT - NSHPLABSRESULTS_GEN_ALL_CORE
Labs                 13.5   7.49  )-----------( 284      ( 13 Aug 2021 22:35 )             38.8     14 Aug 2021 01:21    x      |  x      |  x      ----------------------------<  x      x       |  x      |  0.78     Ca    9.7        13 Aug 2021 22:35    TPro  7.3    /  Alb  3.7    /  TBili  0.5    /  DBili  0.1    /  AST  44     /  ALT  54     /  AlkPhos  75     14 Aug 2021 01:21    LIVER FUNCTIONS - ( 14 Aug 2021 01:21 )  Alb: 3.7 g/dL / Pro: 7.3 gm/dL / ALK PHOS: 75 U/L / ALT: 54 U/L / AST: 44 U/L / GGT: x           PT/INR - ( 14 Aug 2021 01:21 )   PT: 11.5 sec;   INR: 0.99 ratio           CAPILLARY BLOOD GLUCOSE        CARDIAC MARKERS ( 13 Aug 2021 22:35 )  <0.015 ng/mL / x     / x     / x     / x

## 2021-08-14 NOTE — H&P ADULT - ASSESSMENT
26F with a PMH most significant for MDD and Cervical Cancer. She presented to the [facility] with progressive SOB and associated Chest pain. In the ED he was found to be afebrile with HR in the 140's and RR in the 40's sating in the high 90's on RA. Physical exam was remarkable for mild respiratory distress. Laboratory findings were significant for Respiratory Alkalosis on ABG with COVID PCR detected. CXR and EKG were unremarkable. Pt was initiated on IV steroids with Remdisuvir, then admitted to the floor with Acute Respiratory Alkalosis in the setting of COVID Viral Syndrome with ID consulted.      #Acute Respiratory Alkalosis in the setting of COVID Viral Syndrome  -CXR: displayed no acute pathology   -(+) COVID-19 PCR: Detected   -Continue with Supplemental Oxygen PRN should she desat below 91% on RA  -Continue IV Methylprednisolone   -Continue Tylenol 650mg q6hrs PRN for fever  -Continue to monitor for fevers or worsening saturations   -S/p initial dose of Remdisuvir in the ED, pt may not meet criteria to continue   -Consult ID for further management of the above     #MDD  -Pt currently denies active Suicidal Ideations or Plans   -Pt denies current use of antidepressants   -Continue to follow up with PMD as out pt    #Cervical Cancer   -Continue to follow up with PMD as out pt    #Advance Directives  -Full Code    -Pt with capacity         #DVT PPX  -Continue with Lovenox 40mg Sub Q      Case d/w Attending       26F with a PMH most significant for MDD and Cervical Cancer. She presented to the [facility] with progressive SOB and associated Chest pain. In the ED he was found to be afebrile with HR in the 140's and RR in the 40's sating in the high 90's on RA. Physical exam was remarkable for mild respiratory distress. Laboratory findings were significant for Respiratory Alkalosis on ABG with COVID PCR detected. CXR and EKG were unremarkable. Pt was initiated on IV steroids with Remdisuvir, then admitted to the floor with Acute Respiratory Alkalosis in the setting of COVID Viral Syndrome with ID consulted.      #Acute Respiratory Alkalosis in the setting of COVID Viral Syndrome  -CXR: displayed no acute pathology   -(+) COVID-19 PCR: Detected   -Continue with Supplemental Oxygen PRN should she desat below 91% on RA  -Continue IV Methylprednisolone   -Continue Tylenol 650mg q6hrs PRN for fever  -Continue to monitor for fevers or worsening saturations   -S/p initial dose of Remdisuvir in the ED, pt may not meet criteria to continue   -Consult ID for further management of the above     #MDD/ALECIA  -Pt currently denies active Suicidal Ideations or Plans   -Continue with Wellbutrin 150mg qD  -Continue with Xanax PRN 0.25mg  -Continue to follow up with PMD as out pt    #Insomnia   -Continue with Ambien 5mg qHs   -May double up to 10mg qHs in refractory cases, as prescribed out pt     #Cervical Cancer   -S/p surgical resection   -Continue to follow up with PMD as out pt  -Continue with ASA 81mg qD       #Advance Directives  -Full Code    -Pt with capacity         #DVT PPX  -Continue with Lovenox 40mg Sub Q      Case to be d/w Dr. Wadsworth  26F with a PMH most significant for MDD and Cervical Cancer. She presented to the [facility] with progressive SOB and associated Chest pain. In the ED he was found to be afebrile with HR in the 140's and RR in the 40's sating in the high 90's on RA. Physical exam was remarkable for mild respiratory distress. Laboratory findings were significant for Respiratory Alkalosis on ABG with COVID PCR detected. CXR and EKG were unremarkable. Pt was initiated on IV steroids with Remdisuvir, then admitted to the floor with Acute Respiratory Alkalosis in the setting of COVID Viral Syndrome with ID consulted.      #Acute Respiratory Alkalosis in the setting of COVID Viral Syndrome  -CXR: displayed no acute pathology   -(+) COVID-19 PCR: Detected   -Continue with Supplemental Oxygen PRN should she desat below 91% on RA  -Continue IV Methylprednisolone   -Continue Tylenol 650mg q6hrs PRN for fever  -Continue to monitor for fevers or worsening saturations   -S/p initial dose of Remdisuvir in the ED, pt may not meet criteria to continue   -Consult ID for further management of the above   -Hycodan + Tesslon Pearls for Sxs relief of Cough   -Caution with use of Albuterol as pt is intolerant to it, as she experiences SVTs upon use    #MDD/ALECIA  -Pt currently denies active Suicidal Ideations or Plans   -Continue with Wellbutrin 150mg qD  -Continue with Xanax PRN 0.25mg  -Continue to follow up with PMD as out pt    #Insomnia   -Continue with Ambien 5mg qHs   -May double up to 10mg qHs in refractory cases, as prescribed out pt     #Cervical Cancer   -S/p surgical resection   -Continue to follow up with PMD as out pt  -Continue with ASA 81mg qD       #Advance Directives  -Full Code    -Pt with capacity         #DVT PPX  -Continue with Lovenox 40mg Sub Q      Case to be d/w Dr. Wadsworth

## 2021-08-14 NOTE — H&P ADULT - HISTORY OF PRESENT ILLNESS
26F with a PMH significant for Cervical Cancer and MDD who presents [facility] with progressive shortness of breath. She was recently diagnosed with COVID a few days prior and was quarantining and treating herself with conservative management for symptomatic relief until she began experiencing progressive difficulties with her respiration as well as associated pleuritic chest pain, which concerned her to report to the ED for further managment. She endorses one positive contact at her employment and states that she is currently unvaccinated.  26F with a PMH significant for Cervical Cancer and ALECIA/MDD who presents [facility] with progressive shortness of breath. She was recently diagnosed with COVID on Saturday night, and was quarantining while treating herself with conservative management for symptomatic relief. At that time she experienced fevers, chills with severe Myalgias. She is an active smoker of over 2 years and recently quit smoking a week prior where her sxs started. She began experiencing progressive difficulties with her respiration as well as associated pleuritic chest pain, which concerned her to report to the ED for further managment. She endorses one positive contact at her employment and states that she is currently unvaccinated.

## 2021-08-14 NOTE — H&P ADULT - NSHPSOCIALHISTORY_GEN_ALL_CORE
Non vaccinated Pt has a 2 year h/o smoking 1-2 cigarettes' daily, who denies the use of illicit drugs and only drinks occasionally, she resides at home with her family who are currently away on vacation. She is currently unvaccinated and was awaiting clearance from oncology prior to initiate a COVID vaccine series.

## 2021-08-14 NOTE — H&P ADULT - NSHPREVIEWOFSYSTEMS_GEN_ALL_CORE
REVIEW OF SYSTEMS:  CONSTITUTIONAL: (+) weakness/fevers/chills  EYES/ENT: No visual changes;  No vertigo or throat pain   NECK: No pain or stiffness  RESPIRATORY: (+) cough/wheezing (+) shortness of breath  CARDIOVASCULAR: (+) chest pain, No palpitations  GASTROINTESTINAL: No abdominal or epigastric pain. No nausea, vomiting, or hematemesis; No diarrhea or constipation. No melena or hematochezia.  GENITOURINARY: No dysuria, frequency or hematuria  HEMATOLOGIC: No easy bruising, prolonged bleeding from everyday cuts,   NEUROLOGICAL: No numbness or weakness  PSYCH: Denies Insomnia, loss of interest in activities, feelings of guilt, decreased energy, difficulty concentrating, decreased appetite, SI/HI  SKIN: No itching, burning, rashes, or lesions   All other review of systems is negative unless indicated above

## 2021-08-14 NOTE — PROGRESS NOTE ADULT - ATTENDING COMMENTS
26F with a PMH most significant for MDD and Cervical Cancer presents for COVID infxn    #Acute Respiratory Distress 2/2 COVID  -COVID PCR +  -CXR showed no signs of infiltrates  -ABG showed pH 7.67, pCO2 12, HCO3 13  -D-Dimer 319  -CTA Chest showed no evidence of pulmonary embolism  -C-Reactive Protein 12  -Ferritin 254  -No signs of respiratory distress. Satting 100% RA with no signs tachypnea.  -D/C Remdesivir as no longer indicated  -s/p 125mg IV Solumedrol  -ID recs appreciated

## 2021-08-14 NOTE — H&P ADULT - NSHPPHYSICALEXAM_GEN_ALL_CORE
ICU Vital Signs Last 24 Hrs  T(C): 36.9 (13 Aug 2021 23:25), Max: 36.9 (13 Aug 2021 23:00)  T(F): 98.4 (13 Aug 2021 23:25), Max: 98.4 (13 Aug 2021 23:00)  HR: 82 (14 Aug 2021 01:00) (82 - 136)  BP: 117/77 (14 Aug 2021 01:00) (98/73 - 123/97)  BP(mean): 88 (14 Aug 2021 01:00) (73 - 107)  RR: 19 (14 Aug 2021 01:00) (19 - 28)  SpO2: 98% (14 Aug 2021 01:00) (95% - 100%)      PHYSICAL EXAM:  Constitutional: Mild respiratory distress, awake and alert, well-developed   HEENT: PERR, EOMI, Normal Hearing, MMM  Neck: Soft and supple, No LAD, No JVD  Respiratory: Breath sounds are clear bilaterally, No wheezing, rales or rhonchi  Cardiovascular: S1 and S2, tachycardic, no Murmurs, gallops or rubs  Gastrointestinal: Bowel Sounds present, soft, nontender, nondistended, no guarding, no rebound  Extremities: No peripheral edema  Vascular: 2+ peripheral pulses  Neurological: A/O x 3, no focal deficits  Musculoskeletal: 5/5 strength b/l upper and lower extremities  Skin: No rashes

## 2021-08-14 NOTE — H&P ADULT - ATTENDING COMMENTS
Patient is seen and examined with the Resident. Patient presented with chills and fever and dry cough, Cough is better after steroid therapy. Will continue to monitor and give symptomatic treatment. Will follow labs and ID consult.

## 2021-08-14 NOTE — PROGRESS NOTE ADULT - ASSESSMENT
26F with a PMH most significant for MDD and Cervical Cancer presents for COVID infxn    #Acute Respiratory Distress 2/2 COVID  -COVID PCR +  -CXR showed no signs of infiltrates  -ABG showed pH 7.67, pCO2 12, HCO3 13  -D-Dimer 319  -CTA Chest showed no evidence of pulmonary embolism  -No signs of respiratory distress. Satting 100% RA with no signs tachypnea.  -D/C Remdesivir as no longer indicated  -s/p 125mg IV Solumedrol  -ID recs appreciated    #MDD/ALECIA  -Welbutrin 150mg PO QD  -Xanax 0.25mg PO PRN    #Cervical Cancer  -s/p surgical resection  -ASA 81mg QD    #Full Code    #DVT PPX  -Lovenox 40mg SubQ QD    Case seen and discussed with Dr. Baig 26F with a PMH most significant for MDD and Cervical Cancer presents for COVID infxn    #Acute Respiratory Distress 2/2 COVID  -COVID PCR +  -CXR showed no signs of infiltrates  -ABG showed pH 7.67, pCO2 12, HCO3 13  -D-Dimer 319  -CTA Chest showed no evidence of pulmonary embolism  -C-Reactive Protein 12  -Ferritin 254  -No signs of respiratory distress. Satting 100% RA with no signs tachypnea.  -D/C Remdesivir as no longer indicated  -s/p 125mg IV Solumedrol  -ID recs appreciated    #MDD/ALECIA  -Welbutrin 150mg PO QD  -Xanax 0.25mg PO PRN    #Cervical Cancer  -s/p surgical resection  -ASA 81mg QD    #Full Code    #DVT PPX  -Lovenox 40mg SubQ QD    Case seen and discussed with Dr. Baig

## 2021-08-14 NOTE — CONSULT NOTE ADULT - SUBJECTIVE AND OBJECTIVE BOX
Patient is a 26y old  Female who presents with a chief complaint of Progressive COVID Infection (14 Aug 2021 15:30)    HPI:  26F with a PMH significant for Cervical Cancer and ALECIA/MDD admitted on 8/14 for evaluation of cough and shortness of breath, fever and chills; was diagnosed with COVID-19 infection about one week ago. On the "advice" of her oncologist she never was vaccinated. Has known sick contact with COVID-19 infection. Has not required any supplemental oxygen and saturation is 100% on room air.           PMH: as above  PSH: as above  Meds: per reconciliation sheet, noted below  MEDICATIONS  (STANDING):  aspirin  chewable 81 milliGRAM(s) Oral daily  benzonatate 100 milliGRAM(s) Oral three times a day  buPROPion XL (24-Hour) . 150 milliGRAM(s) Oral daily  enoxaparin Injectable 40 milliGRAM(s) SubCutaneous daily  hydrocodone/homatropine Syrup 5 milliLiter(s) Oral three times a day  multivitamin 1 Tablet(s) Oral daily  sodium chloride 0.9%. 1000 milliLiter(s) (75 mL/Hr) IV Continuous <Continuous>    MEDICATIONS  (PRN):  acetaminophen   Tablet .. 650 milliGRAM(s) Oral every 4 hours PRN Temp greater or equal to 38.5C (101.3F)  ALPRAZolam 0.25 milliGRAM(s) Oral daily PRN anxiety  ondansetron Injectable 4 milliGRAM(s) IV Push every 6 hours PRN Nausea and/or Vomiting  zolpidem 5 milliGRAM(s) Oral at bedtime PRN Insomnia    Allergies    albuterol (Unknown)  DayQuil (Unknown)  Mucinex (Unknown)    Intolerances      Social: positive smoking, no alcohol, no illegal drugs; no recent travel, no exposure to TB  FAMILY HISTORY:  No pertinent family history in first degree relatives       no history of premature cardiovascular disease in first degree relatives  ROS: the patient denies fever, no chills, no HA, no dizziness, no sore throat, no blurry vision, no CP, no palpitations, no SOB,  no abdominal pain, no diarrhea, no N/V, no dysuria, no leg pain, no claudication, no rash, no joint aches, no rectal pain or bleeding, no night sweats  All other systems reviewed and are negative    Vital Signs Last 24 Hrs  T(C): 36.7 (14 Aug 2021 09:02), Max: 37.3 (14 Aug 2021 06:53)  T(F): 98.1 (14 Aug 2021 09:02), Max: 99.2 (14 Aug 2021 06:53)  HR: 68 (14 Aug 2021 09:02) (68 - 136)  BP: 107/57 (14 Aug 2021 09:02) (98/73 - 140/75)  BP(mean): 92 (14 Aug 2021 05:22) (73 - 107)  RR: 19 (14 Aug 2021 09:02) (18 - 28)  SpO2: 100% (14 Aug 2021 09:02) (95% - 100%)  Daily     Daily     PE:    Constitutional: frail looking  HEENT: NC/AT, EOMI, PERRLA, conjunctivae clear; ears and nose atraumatic; pharynx clear  Neck: supple; thyroid not palpable  Back: no tenderness  Respiratory: respiratory effort normal; clear to auscultation  Cardiovascular: S1S2 regular, no murmurs  Abdomen: soft, not tender, not distended, positive BS; no liver or spleen organomegaly  Genitourinary: no suprapubic tenderness  Musculoskeletal: no muscle tenderness, no joint swelling or tenderness  Neurological/ Psychiatric: AxOx3, judgement and insight normal;  moving all extremities  Skin: no rashes; no palpable lesions    Labs: all available labs reviewed                        11.8   3.36  )-----------( 216      ( 14 Aug 2021 08:05 )             33.9     08-14    139  |  112<H>  |  6<L>  ----------------------------<  138<H>  4.0   |  21<L>  |  0.54    Ca    8.3<L>      14 Aug 2021 08:05  Phos  2.7     08-14  Mg     2.5     08-14    TPro  7.3  /  Alb  3.7  /  TBili  0.4  /  DBili  x   /  AST  44<H>  /  ALT  57  /  AlkPhos  75  08-14     LIVER FUNCTIONS - ( 14 Aug 2021 08:05 )  Alb: 3.7 g/dL / Pro: 7.3 gm/dL / ALK PHOS: 75 U/L / ALT: 57 U/L / AST: 44 U/L / GGT: x           Respiratory Viral Panel with COVID-19 by LATA (08.07.21 @ 21:45)    SARS-CoV-2: Detected: This Respiratory Panel uses polymerase chain reaction (PCR) to detect for  adenovirus; coronavirus (HKU1, NL63, 229E, OC43); human metapneumovirus  (hMPV); human enterovirus/rhinovirus (Entero/RV); influenza A; influenza  A/H1; influenza A/H3; influenza A/H1-2009; influenza B; parainfluenza  viruses 1, 2, 3, 4; respiratory syncytial virus; Mycoplasma pneumoniae;  Chlamydophila pneumoniae; and SARS-CoV-2.          Radiology: all available radiological tests reviewed  < from: CT Angio Chest PE Protocol w/ IV Cont (08.14.21 @ 10:28) >    IMPRESSION:    No pulmonary embolus.    Mild peripheral groundglass opacities within the upper lobes and right lower lobe.    < end of copied text >    Advanced directives addressed: full resuscitation

## 2021-08-15 ENCOUNTER — TRANSCRIPTION ENCOUNTER (OUTPATIENT)
Age: 26
End: 2021-08-15

## 2021-08-15 VITALS
DIASTOLIC BLOOD PRESSURE: 55 MMHG | OXYGEN SATURATION: 98 % | SYSTOLIC BLOOD PRESSURE: 100 MMHG | TEMPERATURE: 98 F | HEART RATE: 79 BPM | RESPIRATION RATE: 18 BRPM

## 2021-08-15 LAB
ALBUMIN SERPL ELPH-MCNC: 3.2 G/DL — LOW (ref 3.3–5)
ALP SERPL-CCNC: 59 U/L — SIGNIFICANT CHANGE UP (ref 40–120)
ALT FLD-CCNC: 52 U/L — SIGNIFICANT CHANGE UP (ref 12–78)
ANION GAP SERPL CALC-SCNC: 6 MMOL/L — SIGNIFICANT CHANGE UP (ref 5–17)
AST SERPL-CCNC: 36 U/L — SIGNIFICANT CHANGE UP (ref 15–37)
BILIRUB SERPL-MCNC: 0.4 MG/DL — SIGNIFICANT CHANGE UP (ref 0.2–1.2)
BUN SERPL-MCNC: 11 MG/DL — SIGNIFICANT CHANGE UP (ref 7–23)
CALCIUM SERPL-MCNC: 7.9 MG/DL — LOW (ref 8.5–10.1)
CHLORIDE SERPL-SCNC: 113 MMOL/L — HIGH (ref 96–108)
CO2 SERPL-SCNC: 23 MMOL/L — SIGNIFICANT CHANGE UP (ref 22–31)
CREAT SERPL-MCNC: 0.59 MG/DL — SIGNIFICANT CHANGE UP (ref 0.5–1.3)
FERRITIN SERPL-MCNC: 227 NG/ML — HIGH (ref 15–150)
GLUCOSE SERPL-MCNC: 84 MG/DL — SIGNIFICANT CHANGE UP (ref 70–99)
HCT VFR BLD CALC: 31.2 % — LOW (ref 34.5–45)
HGB BLD-MCNC: 10.7 G/DL — LOW (ref 11.5–15.5)
MCHC RBC-ENTMCNC: 31.8 PG — SIGNIFICANT CHANGE UP (ref 27–34)
MCHC RBC-ENTMCNC: 34.3 GM/DL — SIGNIFICANT CHANGE UP (ref 32–36)
MCV RBC AUTO: 92.6 FL — SIGNIFICANT CHANGE UP (ref 80–100)
PLATELET # BLD AUTO: 205 K/UL — SIGNIFICANT CHANGE UP (ref 150–400)
POTASSIUM SERPL-MCNC: 3.5 MMOL/L — SIGNIFICANT CHANGE UP (ref 3.5–5.3)
POTASSIUM SERPL-SCNC: 3.5 MMOL/L — SIGNIFICANT CHANGE UP (ref 3.5–5.3)
PROT SERPL-MCNC: 6 GM/DL — SIGNIFICANT CHANGE UP (ref 6–8.3)
RBC # BLD: 3.37 M/UL — LOW (ref 3.8–5.2)
RBC # FLD: 13.2 % — SIGNIFICANT CHANGE UP (ref 10.3–14.5)
SODIUM SERPL-SCNC: 142 MMOL/L — SIGNIFICANT CHANGE UP (ref 135–145)
WBC # BLD: 5.58 K/UL — SIGNIFICANT CHANGE UP (ref 3.8–10.5)
WBC # FLD AUTO: 5.58 K/UL — SIGNIFICANT CHANGE UP (ref 3.8–10.5)

## 2021-08-15 PROCEDURE — 99239 HOSP IP/OBS DSCHRG MGMT >30: CPT | Mod: GC

## 2021-08-15 RX ORDER — ASPIRIN/CALCIUM CARB/MAGNESIUM 324 MG
1 TABLET ORAL
Qty: 0 | Refills: 0 | DISCHARGE
Start: 2021-08-15

## 2021-08-15 RX ADMIN — BUPROPION HYDROCHLORIDE 150 MILLIGRAM(S): 150 TABLET, EXTENDED RELEASE ORAL at 11:29

## 2021-08-15 RX ADMIN — ENOXAPARIN SODIUM 40 MILLIGRAM(S): 100 INJECTION SUBCUTANEOUS at 09:08

## 2021-08-15 RX ADMIN — Medication 81 MILLIGRAM(S): at 09:07

## 2021-08-15 RX ADMIN — Medication 1 TABLET(S): at 09:08

## 2021-08-15 RX ADMIN — Medication 100 MILLIGRAM(S): at 05:03

## 2021-08-15 NOTE — DISCHARGE NOTE PROVIDER - NSDCCPCAREPLAN_GEN_ALL_CORE_FT
PRINCIPAL DISCHARGE DIAGNOSIS  Diagnosis: 2019 novel coronavirus disease (COVID-19)  Assessment and Plan of Treatment: You were diagnosed with the novel Coronavirus, known as COVID-19.  It is a viral illness that can cause fever, cough and trouble breathing.  Some people may have chills, muscle aches, runny nose, sneezing, sore throat, upset stomach or loose stool.  When leaving the hospital:  • Wear a mask when you leave the hospital.  • Do not use public transportation.  • If you take a car, wear a mask and leave the window open.  • After you arrive home, stay there.  • Perform frequent hand washing.  • Cover your coughs or sneezes.  If your COVID-19 test is positive or if the result in not yet known:  Stay home except to get medical care:  • Do not go to work, school, or public areas, such as stores or movie theaters.  • Do not use public transportation.  Separate yourself from other people and pets in your home:  • Stay in a separate bedroom and use a separate bathroom, if available.  • Ask others to care for your animals.  • Wear a facemask when around other people or pets.  • Cover your coughs and sneezes with a tissue. Throw tissues away in trashcan lined with a plastic  bag and clean your hands after you throw away the tissue.  Clean your hands often:  • Clean your hands often with soap and water (for 20 seconds) or with hand  containing  60% to 95% alcohol.  • If using hand  with alcohol, cover all surfaces of your hands and rub hands together  until they feel dry.  • Use soap and water if your hands are visibly dirty.  • Avoid touching your eyes, nose, and mouth. Clean your hands if you do touch your face.  Do not share household items      SECONDARY DISCHARGE DIAGNOSES  Diagnosis: Shortness of breath  Assessment and Plan of Treatment:      PRINCIPAL DISCHARGE DIAGNOSIS  Diagnosis: 2019 novel coronavirus disease (COVID-19)  Assessment and Plan of Treatment: You were diagnosed with the novel Coronavirus, known as COVID-19.  It is a viral illness that can cause fever, cough and trouble breathing.  Some people may have chills, muscle aches, runny nose, sneezing, sore throat, upset stomach or loose stool. Please continue to take Aspirin 81mg for 30 days  When leaving the hospital:  • Wear a mask when you leave the hospital.  • Do not use public transportation.  • If you take a car, wear a mask and leave the window open.  • After you arrive home, stay there.  • Perform frequent hand washing.  • Cover your coughs or sneezes.  If your COVID-19 test is positive or if the result in not yet known:  Stay home except to get medical care:  • Do not go to work, school, or public areas, such as stores or movie theaters.  • Do not use public transportation.  Separate yourself from other people and pets in your home:  • Stay in a separate bedroom and use a separate bathroom, if available.  • Ask others to care for your animals.  • Wear a facemask when around other people or pets.  • Cover your coughs and sneezes with a tissue. Throw tissues away in trashcan lined with a plastic  bag and clean your hands after you throw away the tissue.  Clean your hands often:  • Clean your hands often with soap and water (for 20 seconds) or with hand  containing  60% to 95% alcohol.  • If using hand  with alcohol, cover all surfaces of your hands and rub hands together  until they feel dry.  • Use soap and water if your hands are visibly dirty.  • Avoid touching your eyes, nose, and mouth. Clean your hands if you do touch your face.  Do not share household items      SECONDARY DISCHARGE DIAGNOSES  Diagnosis: Shortness of breath  Assessment and Plan of Treatment:

## 2021-08-15 NOTE — DISCHARGE NOTE PROVIDER - CARE PROVIDER_API CALL
YUKO GARZA  Gastroenterology  [address] [place] [address]  Phone: [phone]  Fax: [phone]  Follow Up Time:

## 2021-08-15 NOTE — DISCHARGE NOTE PROVIDER - NSDCMRMEDTOKEN_GEN_ALL_CORE_FT
Tessalon Perles 100 mg oral capsule: 1 cap(s) orally every 8 hours, As Needed -for cough    aspirin 81 mg oral tablet, chewable: 1 tab(s) orally once a day  Tessalon Perles 100 mg oral capsule: 1 cap(s) orally every 8 hours, As Needed -for cough

## 2021-08-15 NOTE — DISCHARGE NOTE PROVIDER - HOSPITAL COURSE
26F with a PMH significant for Cervical Cancer and ALECIA/MDD who presents [facility] with progressive shortness of breath. She was recently diagnosed with COVID on Saturday night, and was quarantining while treating herself with conservative management for symptomatic relief. At that time she experienced fevers, chills with severe Myalgias. She is an active smoker of over 2 years and recently quit smoking a week prior where her sxs started. She began experiencing progressive difficulties with her respiration as well as associated pleuritic chest pain, which concerned her to report to the ED for further management. She endorses one positive contact at her employment and states that she is currently unvaccinated. CTA chest was negative for PE, but showed mild peripheral ground-glass opacity involving  upper lobes and right lower lobe.  Patient received 1 dose of Decadron and 1 dose of Remdesivir. Patient inflammatory markers were mildly elevated. Patient respiratory status remained stable, with SpO2>96% on RA during the hospital stay. At this time, patient is medically optimized to be discharge home.     Subjective  No acute distress. Vitals stable. Pt states she feels better today though still has some residual coughing and minimal sputum production. Denies SOB, difficulty breathing, chest pain.      REVIEW OF SYSTEMS:  CONSTITUTIONAL: No weakness, fevers or chills  EYES/ENT: No visual changes;  No vertigo or throat pain   NECK: No pain or stiffness  RESPIRATORY: No cough, wheezing, hemoptysis; No shortness of breath  CARDIOVASCULAR: No chest pain or palpitations  GASTROINTESTINAL: No abdominal or epigastric pain. No nausea, vomiting, or hematemesis; No diarrhea or constipation. No melena or hematochezia.  GENITOURINARY: No dysuria, frequency or hematuria  NEUROLOGICAL: No numbness or weakness  SKIN: No itching, burning, rashes, or lesions   All other review of systems is negative unless indicated above    PHYSICAL EXAM:  Vital Signs Last 24 Hrs  T(C): 37.1 (14 Aug 2021 21:10), Max: 37.1 (14 Aug 2021 21:10)  T(F): 98.7 (14 Aug 2021 21:10), Max: 98.7 (14 Aug 2021 21:10)  HR: 69 (14 Aug 2021 21:10) (68 - 76)  BP: 106/48 (14 Aug 2021 21:10) (106/48 - 119/69)  BP(mean): --  RR: 18 (14 Aug 2021 21:10) (18 - 19)  SpO2: 100% (14 Aug 2021 21:10) (100% - 100%)    Constitutional: Pt lying in bed, awake and alert, NAD  HEENT: EOMI, normal hearing, moist mucous membranes  Neck: Soft and supple, no JVD  Respiratory: CTABL, No wheezing, rales or rhonchi  Cardiovascular: S1S2+, RRR, no M/G/R  Gastrointestinal: BS+, soft, NT/ND, no guarding, no rebound  Extremities: No peripheral edema  Vascular: 2+ peripheral pulses  Neurological: AAOx3, no focal deficits  Musculoskeletal: 5/5 strength b/l upper and lower extremities  Skin: No rashes    < from: CT Angio Chest PE Protocol w/ IV Cont (08.14.21 @ 10:28) >    IMPRESSION:    No pulmonary embolus.    Mild peripheral groundglass opacities within the upper lobes and right lower lobe.    < end of copied text >     26F with a PMH significant for Cervical Cancer and ALECIA/MDD who presents [facility] with progressive shortness of breath. She was recently diagnosed with COVID on Saturday night, and was quarantining while treating herself with conservative management for symptomatic relief. At that time she experienced fevers, chills with severe Myalgias. She is an active smoker of over 2 years and recently quit smoking a week prior where her sxs started. She began experiencing progressive difficulties with her respiration as well as associated pleuritic chest pain, which concerned her to report to the ED for further management. She endorses one positive contact at her employment and states that she is currently unvaccinated. CTA chest was negative for PE, but showed mild peripheral ground-glass opacity involving  upper lobes and right lower lobe.  Patient received 1 dose of Decadron and 1 dose of Remdesivir. Patient inflammatory markers were mildly elevated. Patient respiratory status remained stable, with SpO2>96% on RA during the hospital stay. Patient will be discharged on Aspirin 81 daily x3 days. At this time, patient is medically optimized to be discharge home.     Subjective  No acute distress. Vitals stable. Pt states she feels better today though still has some residual coughing and minimal sputum production. Denies SOB, difficulty breathing, chest pain.      REVIEW OF SYSTEMS:  CONSTITUTIONAL: No weakness, fevers or chills  EYES/ENT: No visual changes;  No vertigo or throat pain   NECK: No pain or stiffness  RESPIRATORY: No cough, wheezing, hemoptysis; No shortness of breath  CARDIOVASCULAR: No chest pain or palpitations  GASTROINTESTINAL: No abdominal or epigastric pain. No nausea, vomiting, or hematemesis; No diarrhea or constipation. No melena or hematochezia.  GENITOURINARY: No dysuria, frequency or hematuria  NEUROLOGICAL: No numbness or weakness  SKIN: No itching, burning, rashes, or lesions   All other review of systems is negative unless indicated above    PHYSICAL EXAM:  Vital Signs Last 24 Hrs  T(C): 37.1 (14 Aug 2021 21:10), Max: 37.1 (14 Aug 2021 21:10)  T(F): 98.7 (14 Aug 2021 21:10), Max: 98.7 (14 Aug 2021 21:10)  HR: 69 (14 Aug 2021 21:10) (68 - 76)  BP: 106/48 (14 Aug 2021 21:10) (106/48 - 119/69)  BP(mean): --  RR: 18 (14 Aug 2021 21:10) (18 - 19)  SpO2: 100% (14 Aug 2021 21:10) (100% - 100%)    Constitutional: Pt lying in bed, awake and alert, NAD  HEENT: EOMI, normal hearing, moist mucous membranes  Neck: Soft and supple, no JVD  Respiratory: CTABL, No wheezing, rales or rhonchi  Cardiovascular: S1S2+, RRR, no M/G/R  Gastrointestinal: BS+, soft, NT/ND, no guarding, no rebound  Extremities: No peripheral edema  Vascular: 2+ peripheral pulses  Neurological: AAOx3, no focal deficits  Musculoskeletal: 5/5 strength b/l upper and lower extremities  Skin: No rashes    < from: CT Angio Chest PE Protocol w/ IV Cont (08.14.21 @ 10:28) >    IMPRESSION:    No pulmonary embolus.    Mild peripheral groundglass opacities within the upper lobes and right lower lobe.    < end of copied text >

## 2021-08-15 NOTE — DISCHARGE NOTE NURSING/CASE MANAGEMENT/SOCIAL WORK - PATIENT PORTAL LINK FT
You can access the FollowMyHealth Patient Portal offered by [facility] by registering at the following website: [email or web address]. By joining [employer]’s FollowMyHealth portal, you will also be able to view your health information using other applications (apps) compatible with our system.

## 2021-08-17 ENCOUNTER — TRANSCRIPTION ENCOUNTER (OUTPATIENT)
Age: 26
End: 2021-08-17

## 2021-08-19 DIAGNOSIS — G47.00 INSOMNIA, UNSPECIFIED: ICD-10-CM

## 2021-08-19 DIAGNOSIS — F32.9 MAJOR DEPRESSIVE DISORDER, SINGLE EPISODE, UNSPECIFIED: ICD-10-CM

## 2021-08-19 DIAGNOSIS — E87.3 ALKALOSIS: ICD-10-CM

## 2021-08-19 DIAGNOSIS — R06.03 ACUTE RESPIRATORY DISTRESS: ICD-10-CM

## 2021-08-19 DIAGNOSIS — Z90.710 ACQUIRED ABSENCE OF BOTH CERVIX AND UTERUS: ICD-10-CM

## 2021-08-19 DIAGNOSIS — F17.210 NICOTINE DEPENDENCE, CIGARETTES, UNCOMPLICATED: ICD-10-CM

## 2021-08-19 DIAGNOSIS — Z85.41 PERSONAL HISTORY OF MALIGNANT NEOPLASM OF CERVIX UTERI: ICD-10-CM

## 2021-08-19 DIAGNOSIS — R91.8 OTHER NONSPECIFIC ABNORMAL FINDING OF LUNG FIELD: ICD-10-CM

## 2021-08-19 DIAGNOSIS — F41.1 GENERALIZED ANXIETY DISORDER: ICD-10-CM

## 2021-08-19 DIAGNOSIS — U07.1 COVID-19: ICD-10-CM

## 2021-09-29 ENCOUNTER — TRANSCRIPTION ENCOUNTER (OUTPATIENT)
Age: 26
End: 2021-09-29

## 2022-05-03 ENCOUNTER — APPOINTMENT (OUTPATIENT)
Dept: OBGYN | Facility: CLINIC | Age: 27
End: 2022-05-03
Payer: COMMERCIAL

## 2022-05-03 VITALS
SYSTOLIC BLOOD PRESSURE: 114 MMHG | WEIGHT: 120 LBS | BODY MASS INDEX: 22.08 KG/M2 | DIASTOLIC BLOOD PRESSURE: 66 MMHG | HEIGHT: 62 IN

## 2022-05-03 DIAGNOSIS — R87.610 ATYPICAL SQUAMOUS CELLS OF UNDETERMINED SIGNIFICANCE ON CYTOLOGIC SMEAR OF CERVIX (ASC-US): ICD-10-CM

## 2022-05-03 DIAGNOSIS — Z32.02 ENCOUNTER FOR PREGNANCY TEST, RESULT NEGATIVE: ICD-10-CM

## 2022-05-03 DIAGNOSIS — Z01.419 ENCOUNTER FOR GYNECOLOGICAL EXAMINATION (GENERAL) (ROUTINE) W/OUT ABNORMAL FINDINGS: ICD-10-CM

## 2022-05-03 DIAGNOSIS — Z11.3 ENCOUNTER FOR SCREENING FOR INFECTIONS WITH A PREDOMINANTLY SEXUAL MODE OF TRANSMISSION: ICD-10-CM

## 2022-05-03 LAB
HCG UR QL: NEGATIVE
QUALITY CONTROL: YES

## 2022-05-03 PROCEDURE — 36415 COLL VENOUS BLD VENIPUNCTURE: CPT

## 2022-05-03 PROCEDURE — 99395 PREV VISIT EST AGE 18-39: CPT

## 2022-05-03 PROCEDURE — 81025 URINE PREGNANCY TEST: CPT

## 2022-05-03 NOTE — PHYSICAL EXAM
[Chaperone Present] : A chaperone was present in the examining room during all aspects of the physical examination [FreeTextEntry1] : olya [Appropriately responsive] : appropriately responsive [Alert] : alert [No Acute Distress] : no acute distress [No Lymphadenopathy] : no lymphadenopathy [Soft] : soft [Non-tender] : non-tender [Non-distended] : non-distended [Oriented x3] : oriented x3 [Examination Of The Breasts] : a normal appearance [No Discharge] : no discharge [No Masses] : no breast masses were palpable [Labia Majora] : normal [Labia Minora] : normal [No Bleeding] : There was no active vaginal bleeding [Normal] : normal [Uterine Adnexae] : non-palpable [FreeTextEntry5] : well-healed from previous surgery

## 2022-05-03 NOTE — HISTORY OF PRESENT ILLNESS
[Oral Contraceptive] : uses oral contraception pills [Y] : Positive pregnancy history [Menarche Age: ____] : age at menarche was [unfilled] [No] : Patient does not have concerns regarding sex [Currently Active] : currently active [Patient would like to be screened for STIs] : Patient would like to be screened for STIs [Papeardate] : 09/05/20 [TextBox_31] : LSIL  [GonorrheaDate] : 05/19/21 [TextBox_63] : NEG  [ChlamydiaDate] : 05/19/21 [TextBox_68] : NEG  [HPVDate] : 09/05/20 [TextBox_78] : POS  [LMPDate] : 04/15/22 [PGHxTotal] : 2 [PGHxABInduced] : 1 [PGHxABSpont] : 1 [FreeTextEntry1] : 04/15/22

## 2022-05-03 NOTE — DISCUSSION/SUMMARY
[FreeTextEntry1] :   The benefits of adequate calcium intake and a daily multivitamin along with routine daily cardiovascular exercise were reviewed with the patient.\par OCPs renewed with correct usage reviewed. \par   The importance of safer-sex was discussed with the patient. She desires screening for sexually transmitted diseases. Bloodwork drawn today in office. \par We reviewed ASCCP/ACOG guidelines for pap smears. 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

## 2022-05-05 LAB
C TRACH RRNA SPEC QL NAA+PROBE: NOT DETECTED
HAV IGM SER QL: NONREACTIVE
HBV CORE IGM SER QL: NONREACTIVE
HBV SURFACE AG SER QL: NONREACTIVE
HCV AB SER QL: NONREACTIVE
HCV S/CO RATIO: 0.12 S/CO
HIV1+2 AB SPEC QL IA.RAPID: NONREACTIVE
HPV HIGH+LOW RISK DNA PNL CVX: NOT DETECTED
N GONORRHOEA RRNA SPEC QL NAA+PROBE: NOT DETECTED
SOURCE TP AMPLIFICATION: NORMAL
T PALLIDUM AB SER QL IA: NEGATIVE

## 2022-05-11 ENCOUNTER — NON-APPOINTMENT (OUTPATIENT)
Age: 27
End: 2022-05-11

## 2022-05-12 LAB — CYTOLOGY CVX/VAG DOC THIN PREP: NORMAL

## 2022-07-14 NOTE — ED ADULT NURSE NOTE - NSFALLRSKPASTHIST_ED_ALL_ED
Pre-Surgery Instructions:   Medication Instructions    acetaminophen (TYLENOL) 325 mg tablet Uses PRN- OK to take day of surgery    albuterol (Ventolin HFA) 90 mcg/act inhaler Uses PRN- OK to take day of surgery    amLODIPine (NORVASC) 5 mg tablet Take day of surgery   DULoxetine HCl (CYMBALTA PO) Take day of surgery   famotidine (PEPCID) 10 mg tablet Take night before surgery    fluticasone (FLONASE) 50 mcg/act nasal spray Uses PRN- OK to take day of surgery    gabapentin (NEURONTIN) 100 mg capsule Take day of surgery   insulin aspart (NovoLOG) 100 units/mL injection Hold day of surgery   insulin detemir (Levemir FlexTouch) 100 Units/mL injection pen Hold day of surgery   Krill Oil (Omega-3) 500 MG CAPS already stopped    levothyroxine 112 mcg tablet Take day of surgery   metoprolol tartrate (LOPRESSOR) 50 mg tablet Take day of surgery   olmesartan (BENICAR) 20 mg tablet Hold day of surgery   pantoprazole (PROTONIX) 40 mg tablet Take day of surgery   pramipexole (MIRAPEX) 0 25 mg tablet Take night before surgery    Probiotic Product (PROBIOTIC PO) Hold day of surgery   torsemide (DEMADEX) 20 mg tablet Hold day of surgery   Vitamin D, Ergocalciferol, 2000 units CAPS Hold day of surgery  [facility] preop instructions reviewed with pt  Pt has antibacterial soap  no

## 2023-06-19 DIAGNOSIS — R93.3 ABNORMAL FINDINGS ON DIAGNOSTIC IMAGING OF OTHER PARTS OF DIGESTIVE TRACT: ICD-10-CM

## 2023-06-21 ENCOUNTER — NON-APPOINTMENT (OUTPATIENT)
Age: 28
End: 2023-06-21

## 2023-06-21 ENCOUNTER — LABORATORY RESULT (OUTPATIENT)
Age: 28
End: 2023-06-21

## 2023-06-21 ENCOUNTER — APPOINTMENT (OUTPATIENT)
Dept: OBGYN | Facility: CLINIC | Age: 28
End: 2023-06-21
Payer: COMMERCIAL

## 2023-06-21 VITALS
BODY MASS INDEX: 23.92 KG/M2 | WEIGHT: 130 LBS | DIASTOLIC BLOOD PRESSURE: 70 MMHG | SYSTOLIC BLOOD PRESSURE: 110 MMHG | TEMPERATURE: 97 F | HEIGHT: 62 IN

## 2023-06-21 DIAGNOSIS — N89.8 OTHER SPECIFIED NONINFLAMMATORY DISORDERS OF VAGINA: ICD-10-CM

## 2023-06-21 DIAGNOSIS — Z12.4 ENCOUNTER FOR SCREENING FOR MALIGNANT NEOPLASM OF CERVIX: ICD-10-CM

## 2023-06-21 DIAGNOSIS — Z01.419 ENCOUNTER FOR GYNECOLOGICAL EXAMINATION (GENERAL) (ROUTINE) W/OUT ABNORMAL FINDINGS: ICD-10-CM

## 2023-06-21 DIAGNOSIS — Z30.40 ENCOUNTER FOR SURVEILLANCE OF CONTRACEPTIVES, UNSPECIFIED: ICD-10-CM

## 2023-06-21 DIAGNOSIS — Z11.3 ENCOUNTER FOR SCREENING FOR INFECTIONS WITH A PREDOMINANTLY SEXUAL MODE OF TRANSMISSION: ICD-10-CM

## 2023-06-21 LAB
HCG UR QL: NEGATIVE
QUALITY CONTROL: YES

## 2023-06-21 PROCEDURE — 99395 PREV VISIT EST AGE 18-39: CPT

## 2023-06-21 PROCEDURE — 81025 URINE PREGNANCY TEST: CPT

## 2023-06-21 RX ORDER — PANTOPRAZOLE SODIUM 20 MG/1
TABLET, DELAYED RELEASE ORAL
Refills: 0 | Status: ACTIVE | COMMUNITY

## 2023-06-21 RX ORDER — DESOGESTREL/ETHINYL ESTRADIOL AND ETHINYL ESTRADIOL 21-5 (28)
0.15-0.02/0.01 KIT ORAL
Qty: 3 | Refills: 3 | Status: ACTIVE | COMMUNITY
Start: 2021-05-19 | End: 1900-01-01

## 2023-06-21 RX ORDER — CEFPODOXIME PROXETIL 200 MG/1
TABLET, FILM COATED ORAL
Refills: 0 | Status: ACTIVE | COMMUNITY

## 2023-06-21 RX ORDER — ALPRAZOLAM 0.5 MG/1
0.5 TABLET ORAL
Refills: 0 | Status: ACTIVE | COMMUNITY

## 2023-06-21 NOTE — PLAN
[FreeTextEntry1] : vaginal lesion\par - possibly bruising from recent intercourse ( pt reports her new partner is larger than she is used to).\par - follow up in 2 weeks\par - pap obtained from this area also included with todays screening\par - if lesion is still present in 2 weeks she will be referred for a colpo even if pap is normal, given her history\par \par continue with ocps\par \par self breast exams/breast awareness encouraged\par \par follow up with GI as planned.

## 2023-06-21 NOTE — HISTORY OF PRESENT ILLNESS
[Oral Contraceptive] : uses oral contraception pills [Y] : Positive pregnancy history [Menarche Age: ____] : age at menarche was [unfilled] [No] : Patient does not have concerns regarding sex [Currently Active] : currently active [Men] : men [TextBox_4] : Denice is here for an annual exam.  She has a history of abnormal paps.\par \par 2019- ASCUS + HPV\par 9/2020- LSIL + HPV\par 10/2020- colpo showing VELIA 2/3\par pt followed up with [facility] and reports having a cold knife cone procedure\par 5/2022- normal pap, HPV negative\par \par She is currently being treated for pyelonephritis- on her second week of abx.  No longer symptomatic.\par \par She is being followed for a pancreatic lesion- has MRI scheduled. She also has gallstones- having work up completed.\par  [PapSmeardate] : 05/03/22 [TextBox_31] : NEG [GonorrheaDate] : 05/03/22 [TextBox_63] : NEG [ChlamydiaDate] : 05/03/22 [TextBox_68] : NEG [HPVDate] : 05/03/22 [TextBox_78] : NEG [LMPDate] : 05/26/23 [PGHxTotal] : 2 [[facility]Living] : 0 [PGHxABInduced] : 1 [PGHxABSpont] : 1 [FreeTextEntry1] : 05/26/23

## 2023-06-21 NOTE — PHYSICAL EXAM
[Appropriately responsive] : appropriately responsive [Alert] : alert [No Acute Distress] : no acute distress [Soft] : soft [Non-tender] : non-tender [Non-distended] : non-distended [No Lesions] : no lesions [No Mass] : no mass [Oriented x3] : oriented x3 [Examination Of The Breasts] : a normal appearance [] : implants [No Masses] : no breast masses were palpable [Labia Majora] : normal [Labia Minora] : normal [No Bleeding] : There was no active vaginal bleeding [Normal] : normal [Uterine Adnexae] : normal [Chaperone Present] : A chaperone was present in the examining room during all aspects of the physical examination [FreeTextEntry1] : DUSTY Martinez [FreeTextEntry4] : patch of erythema- possible ecchymosis? vaginal wall 10-11oclock just above cervix.

## 2023-06-28 ENCOUNTER — NON-APPOINTMENT (OUTPATIENT)
Age: 28
End: 2023-06-28

## 2023-06-28 LAB
CYTOLOGY CVX/VAG DOC THIN PREP: ABNORMAL
HPV HIGH+LOW RISK DNA PNL CVX: DETECTED

## 2023-07-07 ENCOUNTER — APPOINTMENT (OUTPATIENT)
Dept: OBGYN | Facility: CLINIC | Age: 28
End: 2023-07-07
Payer: COMMERCIAL

## 2023-07-07 ENCOUNTER — APPOINTMENT (OUTPATIENT)
Dept: OBGYN | Facility: CLINIC | Age: 28
End: 2023-07-07

## 2023-07-07 VITALS
BODY MASS INDEX: 23.66 KG/M2 | WEIGHT: 128.6 LBS | DIASTOLIC BLOOD PRESSURE: 66 MMHG | SYSTOLIC BLOOD PRESSURE: 116 MMHG | HEIGHT: 62 IN

## 2023-07-07 DIAGNOSIS — B97.7 PAPILLOMAVIRUS AS THE CAUSE OF DISEASES CLASSIFIED ELSEWHERE: ICD-10-CM

## 2023-07-07 LAB
HCG UR QL: NEGATIVE
QUALITY CONTROL: YES

## 2023-07-07 PROCEDURE — 57454 BX/CURETT OF CERVIX W/SCOPE: CPT

## 2023-07-07 PROCEDURE — 81025 URINE PREGNANCY TEST: CPT

## 2023-07-07 NOTE — PROCEDURE
[Colposcopy] : Colposcopy  [Time out performed] : Pre-procedure time out performed.  Patient's name, date of birth and procedure confirmed. [Consent Obtained] : Consent obtained [Risks] : risks [Benefits] : benefits [Alternatives] : alternatives [Patient] : patient [Infection] : infection [Bleeding] : bleeding [Allergic Reaction] : allergic reaction [ASCUS] : ASCUS [HPV High Risk] : HPV high risk [No Premedication] : no premedication [SCI Fully Visualized] : SCI not fully visualized [ECC Performed] : ECC performed [No Abnormalities] : no abnormalities [Biopsy] : biopsy taken [Hemostasis Obtained] : Hemostasis obtained [Tolerated Well] : the patient tolerated the procedure well [de-identified] : Hx of [facility] in 2020 [de-identified] : Lesion seen at annual exam no longer seen [de-identified] : 1 [de-identified] : Small polypoid lesion just deep to external os at 12 o'clock, also highlighted with acetowhite changes, no other lesions noted. [de-identified] : 12 o'clock [de-identified] : [facility] used [de-identified] : Single cervical lesion as above, biopsied

## 2023-07-07 NOTE — PLAN
[FreeTextEntry1] : 27 y/o with hx of [facility] for VELIA 3 in 2020, recent ASCUS/HPV+ pap, presenting for colposcopy today\par - Colpo performed as above, ECC and 12 o'clock biopsy taken\par - Follow up path results; pt plans to return to [facility] if excision needed again\par - Some discharge on exam today that appears like candida; pt asymptomatic. Advised if symptoms develop to take Monistat or call office and I can prescribe fluconazole, but no need to treat if asymptomatic.\par - Follow up pending biopsy results.\par \par Zay Owen MD

## 2023-07-17 ENCOUNTER — TRANSCRIPTION ENCOUNTER (OUTPATIENT)
Age: 28
End: 2023-07-17

## 2023-07-18 LAB — CORE LAB BIOPSY: NORMAL

## 2023-10-20 NOTE — ED ADULT NURSE NOTE - PAIN RATING/NUMBER SCALE (0-10): ACTIVITY
4 Procedure Summary (Optional): Decision to perform surgery\\nAfter the Mohs procedure was completed, a separate and distinct evaluation and management was performed for the resultant surgical defect for potential reconstruction.\\n\\nHistory: \\nUlcerative wound s/p Mohs surgery with tumor-free clear surgical margins performed today. Physician reviewed the medications, allergies, social history including place of residence and family support systems, ability to manage wound care and transportation issues, smoking and alcohol use, past medical and surgical history to determine how anticoagulants, artificial joints/valves, pacemakers/defibrillators, prior surgeries in the vicinity, and immunosuppression, among other factors will impact the reconstruction options, bleeding risks and need for surgical site and/or implanted device antibiotic prophylaxis. \\n\\nSkin Examination: \\nThe resultant cutaneous wound, surrounding soft tissue and adjacent anatomic free margins were examined and palpated to determine tissue mobility, laxity and risks of functional distortion and loss of normal anatomic form with various reconstruction options.\\n\\nMedical Decision Making:\\nThere is substantial tissue loss (evidenced in photographs) after tumor extirpation involving critical aesthetic and functional units. Failure to repair this defect (i.e. second intention healing) would result in compromise of function and/or form. Based on the history, exam and assessment, it was determined that surgical repair was indicated. Primary repair would significantly negatively impact function and/or form of involved structures and was deemed unsuitable. Additional options for repair including local skin flaps, various grafts and skin substitutes were evaluated and considered. After this evaluation, surgical repair was chosen as indicated and specified in the Repair section below. The repair options were discussed with the patient (and family) including but not limited to impact on function, form and risks/benefits of chosen repair.\\n*Avoid distortion of normal anatomy by forces of wound contraction of second intention healing. \\n*Facilitate wound healing and thereby minimize morbidity of second intention healing. \\n*Enhance functional outcome following tumor extirpation. \\n*Restore normal anatomic appearance following tumor extirpation. \\n

## 2024-04-22 ENCOUNTER — EMERGENCY (EMERGENCY)
Facility: HOSPITAL | Age: 29
LOS: 0 days | Discharge: HOME | End: 2024-04-22
Attending: EMERGENCY MEDICINE
Payer: COMMERCIAL

## 2024-04-22 VITALS
RESPIRATION RATE: 22 BRPM | HEART RATE: 102 BPM | DIASTOLIC BLOOD PRESSURE: 59 MMHG | TEMPERATURE: 98 F | SYSTOLIC BLOOD PRESSURE: 129 MMHG | OXYGEN SATURATION: 100 %

## 2024-04-22 VITALS
TEMPERATURE: 98 F | DIASTOLIC BLOOD PRESSURE: 72 MMHG | RESPIRATION RATE: 20 BRPM | SYSTOLIC BLOOD PRESSURE: 108 MMHG | OXYGEN SATURATION: 100 % | HEART RATE: 76 BPM

## 2024-04-22 DIAGNOSIS — R19.7 DIARRHEA, UNSPECIFIED: ICD-10-CM

## 2024-04-22 DIAGNOSIS — R10.9 UNSPECIFIED ABDOMINAL PAIN: ICD-10-CM

## 2024-04-22 DIAGNOSIS — Z88.2 ALLERGY STATUS TO SULFONAMIDES: ICD-10-CM

## 2024-04-22 DIAGNOSIS — R11.2 NAUSEA WITH VOMITING, UNSPECIFIED: ICD-10-CM

## 2024-04-22 DIAGNOSIS — N93.9 ABNORMAL UTERINE AND VAGINAL BLEEDING, UNSPECIFIED: ICD-10-CM

## 2024-04-22 DIAGNOSIS — Z88.1 ALLERGY STATUS TO OTHER ANTIBIOTIC AGENTS STATUS: ICD-10-CM

## 2024-04-22 DIAGNOSIS — H55.09 OTHER FORMS OF NYSTAGMUS: ICD-10-CM

## 2024-04-22 DIAGNOSIS — Z87.59 PERSONAL HISTORY OF OTHER COMPLICATIONS OF PREGNANCY, CHILDBIRTH AND THE PUERPERIUM: ICD-10-CM

## 2024-04-22 DIAGNOSIS — R42 DIZZINESS AND GIDDINESS: ICD-10-CM

## 2024-04-22 DIAGNOSIS — Z88.8 ALLERGY STATUS TO OTHER DRUGS, MEDICAMENTS AND BIOLOGICAL SUBSTANCES: ICD-10-CM

## 2024-04-22 LAB
ALBUMIN SERPL ELPH-MCNC: 3.7 G/DL — SIGNIFICANT CHANGE UP (ref 3.3–5)
ALP SERPL-CCNC: 69 U/L — SIGNIFICANT CHANGE UP (ref 40–120)
ALT FLD-CCNC: 42 U/L — SIGNIFICANT CHANGE UP (ref 12–78)
ANION GAP SERPL CALC-SCNC: 7 MMOL/L — SIGNIFICANT CHANGE UP (ref 5–17)
APPEARANCE UR: CLEAR — SIGNIFICANT CHANGE UP
AST SERPL-CCNC: 52 U/L — HIGH (ref 15–37)
BASOPHILS # BLD AUTO: 0.03 K/UL — SIGNIFICANT CHANGE UP (ref 0–0.2)
BASOPHILS NFR BLD AUTO: 0.7 % — SIGNIFICANT CHANGE UP (ref 0–2)
BILIRUB SERPL-MCNC: 0.3 MG/DL — SIGNIFICANT CHANGE UP (ref 0.2–1.2)
BILIRUB UR-MCNC: NEGATIVE — SIGNIFICANT CHANGE UP
BUN SERPL-MCNC: 11 MG/DL — SIGNIFICANT CHANGE UP (ref 7–23)
CALCIUM SERPL-MCNC: 9.6 MG/DL — SIGNIFICANT CHANGE UP (ref 8.5–10.1)
CHLORIDE SERPL-SCNC: 109 MMOL/L — HIGH (ref 96–108)
CO2 SERPL-SCNC: 24 MMOL/L — SIGNIFICANT CHANGE UP (ref 22–31)
COLOR SPEC: YELLOW — SIGNIFICANT CHANGE UP
CREAT SERPL-MCNC: 0.79 MG/DL — SIGNIFICANT CHANGE UP (ref 0.5–1.3)
DIFF PNL FLD: NEGATIVE — SIGNIFICANT CHANGE UP
EGFR: 105 ML/MIN/1.73M2 — SIGNIFICANT CHANGE UP
EOSINOPHIL # BLD AUTO: 0.02 K/UL — SIGNIFICANT CHANGE UP (ref 0–0.5)
EOSINOPHIL NFR BLD AUTO: 0.5 % — SIGNIFICANT CHANGE UP (ref 0–6)
GLUCOSE SERPL-MCNC: 100 MG/DL — HIGH (ref 70–99)
GLUCOSE UR QL: NEGATIVE MG/DL — SIGNIFICANT CHANGE UP
HCT VFR BLD CALC: 39.6 % — SIGNIFICANT CHANGE UP (ref 34.5–45)
HGB BLD-MCNC: 13.3 G/DL — SIGNIFICANT CHANGE UP (ref 11.5–15.5)
IMM GRANULOCYTES NFR BLD AUTO: 0.2 % — SIGNIFICANT CHANGE UP (ref 0–0.9)
KETONES UR-MCNC: NEGATIVE MG/DL — SIGNIFICANT CHANGE UP
LEUKOCYTE ESTERASE UR-ACNC: NEGATIVE — SIGNIFICANT CHANGE UP
LIDOCAIN IGE QN: 44 U/L — SIGNIFICANT CHANGE UP (ref 13–75)
LYMPHOCYTES # BLD AUTO: 2.58 K/UL — SIGNIFICANT CHANGE UP (ref 1–3.3)
LYMPHOCYTES # BLD AUTO: 62.9 % — HIGH (ref 13–44)
MCHC RBC-ENTMCNC: 30.9 PG — SIGNIFICANT CHANGE UP (ref 27–34)
MCHC RBC-ENTMCNC: 33.6 GM/DL — SIGNIFICANT CHANGE UP (ref 32–36)
MCV RBC AUTO: 92.1 FL — SIGNIFICANT CHANGE UP (ref 80–100)
MONOCYTES # BLD AUTO: 0.25 K/UL — SIGNIFICANT CHANGE UP (ref 0–0.9)
MONOCYTES NFR BLD AUTO: 6.1 % — SIGNIFICANT CHANGE UP (ref 2–14)
NEUTROPHILS # BLD AUTO: 1.21 K/UL — LOW (ref 1.8–7.4)
NEUTROPHILS NFR BLD AUTO: 29.6 % — LOW (ref 43–77)
NITRITE UR-MCNC: NEGATIVE — SIGNIFICANT CHANGE UP
PH UR: 6.5 — SIGNIFICANT CHANGE UP (ref 5–8)
PLATELET # BLD AUTO: 206 K/UL — SIGNIFICANT CHANGE UP (ref 150–400)
POCT URINE PREGNANCY TEST: NEGATIVE — SIGNIFICANT CHANGE UP
POTASSIUM SERPL-MCNC: 4.1 MMOL/L — SIGNIFICANT CHANGE UP (ref 3.5–5.3)
POTASSIUM SERPL-SCNC: 4.1 MMOL/L — SIGNIFICANT CHANGE UP (ref 3.5–5.3)
PROT SERPL-MCNC: 7.7 GM/DL — SIGNIFICANT CHANGE UP (ref 6–8.3)
PROT UR-MCNC: NEGATIVE MG/DL — SIGNIFICANT CHANGE UP
RBC # BLD: 4.3 M/UL — SIGNIFICANT CHANGE UP (ref 3.8–5.2)
RBC # FLD: 13 % — SIGNIFICANT CHANGE UP (ref 10.3–14.5)
SODIUM SERPL-SCNC: 140 MMOL/L — SIGNIFICANT CHANGE UP (ref 135–145)
SP GR SPEC: >1.03 — HIGH (ref 1–1.03)
UROBILINOGEN FLD QL: 0.2 MG/DL — SIGNIFICANT CHANGE UP (ref 0.2–1)
WBC # BLD: 4.1 K/UL — SIGNIFICANT CHANGE UP (ref 3.8–10.5)
WBC # FLD AUTO: 4.1 K/UL — SIGNIFICANT CHANGE UP (ref 3.8–10.5)

## 2024-04-22 PROCEDURE — 85025 COMPLETE CBC W/AUTO DIFF WBC: CPT

## 2024-04-22 PROCEDURE — 36415 COLL VENOUS BLD VENIPUNCTURE: CPT

## 2024-04-22 PROCEDURE — 99285 EMERGENCY DEPT VISIT HI MDM: CPT

## 2024-04-22 PROCEDURE — 96374 THER/PROPH/DIAG INJ IV PUSH: CPT | Mod: XU

## 2024-04-22 PROCEDURE — 81025 URINE PREGNANCY TEST: CPT

## 2024-04-22 PROCEDURE — 83690 ASSAY OF LIPASE: CPT

## 2024-04-22 PROCEDURE — 81003 URINALYSIS AUTO W/O SCOPE: CPT

## 2024-04-22 PROCEDURE — 74177 CT ABD & PELVIS W/CONTRAST: CPT | Mod: 26,MC

## 2024-04-22 PROCEDURE — 80053 COMPREHEN METABOLIC PANEL: CPT

## 2024-04-22 PROCEDURE — 99284 EMERGENCY DEPT VISIT MOD MDM: CPT | Mod: 25

## 2024-04-22 PROCEDURE — 74177 CT ABD & PELVIS W/CONTRAST: CPT | Mod: MC

## 2024-04-22 RX ORDER — ONDANSETRON 8 MG/1
4 TABLET, FILM COATED ORAL ONCE
Refills: 0 | Status: COMPLETED | OUTPATIENT
Start: 2024-04-22 | End: 2024-04-22

## 2024-04-22 RX ORDER — SODIUM CHLORIDE 9 MG/ML
2000 INJECTION INTRAMUSCULAR; INTRAVENOUS; SUBCUTANEOUS ONCE
Refills: 0 | Status: COMPLETED | OUTPATIENT
Start: 2024-04-22 | End: 2024-04-22

## 2024-04-22 RX ORDER — ONDANSETRON 8 MG/1
1 TABLET, FILM COATED ORAL
Qty: 1 | Refills: 0
Start: 2024-04-22

## 2024-04-22 RX ADMIN — SODIUM CHLORIDE 4000 MILLILITER(S): 9 INJECTION INTRAMUSCULAR; INTRAVENOUS; SUBCUTANEOUS at 07:00

## 2024-04-22 RX ADMIN — ONDANSETRON 4 MILLIGRAM(S): 8 TABLET, FILM COATED ORAL at 06:57

## 2024-04-22 NOTE — ED ADULT NURSE REASSESSMENT NOTE - NS ED NURSE REASSESS COMMENT FT1
assumed care from overnight RN. pt is awake, alert, and speaking in full and complete sentences without difficulty. family at bedside. urine collected and sent to laboratory.

## 2024-04-22 NOTE — ED ADULT TRIAGE NOTE - CHIEF COMPLAINT QUOTE
Pt presented to the ED in wheelchair. Pt had miscarriage Wednesday. Pt was 3-4 weeks pregnant at the time. Pt complaints of N/V/D, cramps, back pain since wednesday. history of vertigo, dizziness and fatigue. Pt took Tylenol 2AM.

## 2024-04-22 NOTE — ED PROVIDER NOTE - NSFOLLOWUPINSTRUCTIONS_ED_ALL_ED_FT
Please call and follow up with your doctor in 1-3 days.    Use Tylenol (acetaminophen) 1000mg every 6 hours and Motrin (Ibuprofen) 600 mg every 6 hours as need for pain.    Use Zofran every 8 hours as needed for nausea/vomit    Return to the Emergency Department for worsening or persistent symptoms, and/or ANY NEW OR CONCERNING SYMPTOMS. If you have issues obtaining follow up, please call: [phone] or [phone]  to obtain a doctor or specialist who takes your insurance in your area.    Abdominal Pain    WHAT YOU NEED TO KNOW:    Abdominal pain can be dull, achy, or sharp. You may have pain in one area of your abdomen, or in your entire abdomen. Your pain may be caused by a condition such as constipation, food sensitivity or poisoning, infection, or a blockage. Abdominal pain can also be from a hernia, appendicitis, or an ulcer. Liver, gallbladder, or kidney conditions can also cause abdominal pain. The cause of your abdominal pain may not be known.  Abdominal Organs    DISCHARGE INSTRUCTIONS:    Call your local emergency number (911 in the [place]) if:    You have chest pain or shortness of breath.    Return to the emergency department if:    You have pulsing pain in your upper abdomen or lower back that suddenly becomes constant.    Your pain is in the right lower abdominal area and worsens with movement.    You have a fever over 100.4°F (38°C) or shaking chills.    You are vomiting and cannot keep food or liquids down.    Your pain does not improve or gets worse over the next 8 to 12 hours.    You see blood in your vomit or bowel movements, or they look black and tarry.    Your skin or the whites of your eyes turn yellow.    You are a woman and have a large amount of vaginal bleeding that is not your monthly period.  Call your doctor if:    You have pain in your lower back.    You are a man and have pain in your testicles.    You have pain when you urinate.    You have questions or concerns about your condition or care.  Medicines: You may need any of the following:    Medicines may be given to calm your stomach or prevent vomiting.    Prescription pain medicine may be given. Ask your healthcare provider how to take this medicine safely. Some prescription pain medicines contain acetaminophen. Do not take other medicines that contain acetaminophen without talking to your healthcare provider. Too much acetaminophen may cause liver damage. Prescription pain medicine may cause constipation. Ask your healthcare provider how to prevent or treat constipation.    Take your medicine as directed. Contact your healthcare provider if you think your medicine is not helping or if you have side effects. Tell your provider if you are allergic to any medicine. Keep a list of the medicines, vitamins, and herbs you take. Include the amounts, and when and why you take them. Bring the list or the pill bottles to follow-up visits. Carry your medicine list with you in case of an emergency.  Manage or prevent abdominal pain:    Apply heat on your abdomen for 20 to 30 minutes every 2 hours for as many days as directed. Heat helps decrease pain and muscle spasms.    Make changes to the foods you eat, if needed. Do not eat foods that cause abdominal pain or other symptoms. Eat small meals more often. The following changes may also help:  Eat more high-fiber foods if you are constipated. High-fiber foods include fruits, vegetables, whole-grain foods, and legumes such as wagner beans.        Do not eat foods that cause gas if you have bloating. Examples include broccoli, cabbage, beans, and carbonated drinks.    Do not eat foods or drinks that contain sorbitol or fructose if you have diarrhea and bloating. Some examples are fruit juices, candy, jelly, and sugar-free gum.    Do not eat high-fat foods. Examples include fried foods, cheeseburgers, hot dogs, and desserts.    Make changes to the liquids you drink, if needed. Do not drink liquids that cause pain or make it worse, such as orange juice. Drink liquids throughout the day to stay hydrated. The following changes may also help:  Drink more liquids to prevent dehydration from diarrhea or vomiting. Ask your healthcare provider how much liquid to drink each day and which liquids are best for you.    Limit or do not have caffeine. Caffeine may make symptoms such as heartburn or nausea worse.    Limit or do not drink alcohol. Alcohol can make your abdominal pain worse. Ask your healthcare provider if it is okay for you to drink alcohol. Also ask how much is okay for you to drink. A drink of alcohol is 12 ounces of beer, ½ ounce of liquor, or 5 ounces of wine.    Keep a diary of your abdominal pain. A diary may help your healthcare provider learn what is causing your pain. Include when the pain happens, how long it lasts, and what the pain feels like. Write down any other symptoms you have with abdominal pain. Also write down what you eat, and any symptoms you have after you eat.    Manage stress. Stress may cause abdominal pain. Your healthcare provider may recommend relaxation techniques and deep breathing exercises to help decrease your stress. Your healthcare provider may recommend you talk to someone about your stress or anxiety, such as a counselor or a friend. Get plenty of sleep. Exercise regularly.   FAMILY WALKING FOR EXERCISE      Do not smoke. Nicotine and other chemicals in cigarettes can damage your esophagus and stomach. Ask your healthcare provider for information if you currently smoke and need help to quit. E-cigarettes or smokeless tobacco still contain nicotine. Talk to your healthcare provider before you use these products.  Follow up with your doctor as directed: Write down your questions so you remember to ask them during your visits.

## 2024-04-22 NOTE — ED PROVIDER NOTE - PHYSICAL EXAMINATION
Constitutional: NAD AAOx3  Eyes: EOMI, pupils equal, + horizontal nystagmus  Head: Normocephalic atraumatic  Mouth: no airway obstruction, dry mm, no exudate, but red macules oropharynx  Cardiac: regular rate   Resp: Lungs CTAB  GI: Abd s/nt/nd  Neuro: CN2-12 intact  Skin: no rash on hands

## 2024-04-22 NOTE — ED PROVIDER NOTE - OBJECTIVE STATEMENT
29-year-old patient with past medical history for cervical neoplasm status post resection, presents emergency department for abdominal pain, nausea, vomiting, diarrhea.  Patient states that she had a miscarriage about 1 week ago and had vaginal bleeding lasting about 2 days.   since that point patient has been having vomiting and diarrhea.  Vomiting seems to improved however patient still with diarrhea.  Nonbloody.    patient has been feeling dizzy and given that dizziness is getting worse came to the hospital today.  Patient does have history of having vertigo, for which she takes meclizine, and meclizine is not helping.  No travel, no sick contacts.

## 2024-04-22 NOTE — ED ADULT NURSE NOTE - PAIN: BODY LOCATION
Routing refill request to provider for review/approval because:  Medication is reported/historical  Maggie ANDERSON RN         abdomen/Bilateral:

## 2024-04-22 NOTE — ED ADULT NURSE NOTE - NSICDXPASTMEDICALHX_GEN_ALL_CORE_FT
Intact PAST MEDICAL HISTORY:  Depression, unspecified depression type     History of respiratory failure     Malignant neoplasm of cervix, unspecified site

## 2024-04-22 NOTE — ED PROVIDER NOTE - CLINICAL SUMMARY MEDICAL DECISION MAKING FREE TEXT BOX
29-year-old patient presents emerged department with vomiting and diarrhea.  Plan check labs, IV hydration, sepsis.

## 2024-04-22 NOTE — ED PROVIDER NOTE - PATIENT PORTAL LINK FT
You can access the FollowMyHealth Patient Portal offered by [facility] by registering at the following website: [email or web address]. By joining [employer]’s FollowMyHealth portal, you will also be able to view your health information using other applications (apps) compatible with our system.

## 2024-04-22 NOTE — ED ADULT NURSE NOTE - OBJECTIVE STATEMENT
pt. came in from home BIBEMS with c/o abdominal discomfort, N/V and not feeling well since last week, had a spontaneous miscarriage last wednesday, she had 2 previous miscarriage, alert and oriented, safety maintained applied.

## 2024-04-22 NOTE — ED PROVIDER NOTE - PROGRESS NOTE DETAILS
ED Attending Dr. Pichardo,  patient, mother, boyfriend updated on results for test.  Patient is feeling better after fluids.  Plan DC with Zofran and follow-up with PMD

## 2024-07-19 ENCOUNTER — RX RENEWAL (OUTPATIENT)
Age: 29
End: 2024-07-19

## 2024-07-19 RX ORDER — DESOGESTREL/ETHINYL ESTRADIOL AND ETHINYL ESTRADIOL 21-5 (28)
0.15-0.02/0.01 KIT ORAL
Qty: 84 | Refills: 3 | Status: ACTIVE | COMMUNITY
Start: 2024-07-19 | End: 1900-01-01

## 2024-07-24 ENCOUNTER — NON-APPOINTMENT (OUTPATIENT)
Age: 29
End: 2024-07-24

## 2024-07-24 ENCOUNTER — APPOINTMENT (OUTPATIENT)
Dept: OBGYN | Facility: CLINIC | Age: 29
End: 2024-07-24
Payer: COMMERCIAL

## 2024-07-24 VITALS
TEMPERATURE: 98.7 F | DIASTOLIC BLOOD PRESSURE: 76 MMHG | HEIGHT: 62 IN | BODY MASS INDEX: 24.84 KG/M2 | SYSTOLIC BLOOD PRESSURE: 114 MMHG | WEIGHT: 135 LBS

## 2024-07-24 DIAGNOSIS — N89.8 OTHER SPECIFIED NONINFLAMMATORY DISORDERS OF VAGINA: ICD-10-CM

## 2024-07-24 DIAGNOSIS — R30.0 DYSURIA: ICD-10-CM

## 2024-07-24 DIAGNOSIS — Z11.3 ENCOUNTER FOR SCREENING FOR INFECTIONS WITH A PREDOMINANTLY SEXUAL MODE OF TRANSMISSION: ICD-10-CM

## 2024-07-24 LAB
APPEARANCE: CLEAR
BILIRUBIN URINE: NEGATIVE
BLOOD URINE: ABNORMAL
COLOR: YELLOW
GLUCOSE QUALITATIVE U: NEGATIVE
KETONES URINE: NEGATIVE
LEUKOCYTE ESTERASE URINE: NEGATIVE
NITRITE URINE: POSITIVE
PH URINE: 6
PROTEIN URINE: NEGATIVE
SPECIFIC GRAVITY URINE: 1.01
UROBILINOGEN URINE: 0.2 (ref 0.2–?)

## 2024-07-24 PROCEDURE — 99213 OFFICE O/P EST LOW 20 MIN: CPT

## 2024-07-24 PROCEDURE — 99459 PELVIC EXAMINATION: CPT

## 2024-07-24 NOTE — REASON FOR VISIT
[Follow-Up] : a follow-up evaluation of [FreeTextEntry2] : pt states went for brazilin wax states she was burned, vaginal area became red and swollen. pt sates currently expericening buring sensation during urination, itchiness, sensitivity

## 2024-07-24 NOTE — PLAN
[FreeTextEntry1] : Suspect primary HSV outbreak, with possible UTI as well.  Will start valtrex and keflex. Pt urged to increase fluids, void with use of extra water over the labia to dilute the urine.  Lidocaine jelly provided.  Cool compresses prn.    Warning signs for urgent attention discussed- pt to call if urinary retention, back pain, return of fever, etc.  Pt to f/u in 48 hours if symptoms do not start to improve.    Recommend 1 week follow up to ensure she is feeling better and to debrief.    HSV discussed- likely primary outbreak given symptom timing and fever.    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

## 2024-07-24 NOTE — PHYSICAL EXAM
[Chaperone Present] : A chaperone was present in the examining room during all aspects of the physical examination [15891] : A chaperone was present during the pelvic exam. [FreeTextEntry2] : DUSTY Mcgregor [Appropriately responsive] : appropriately responsive [Alert] : alert [No Acute Distress] : no acute distress [Oriented x3] : oriented x3 [Labia Majora] : normal [Labia Minora] : normal [Discharge] : a  ~M vaginal discharge was present [No Bleeding] : There was no active vaginal bleeding [Normal] : normal [FreeTextEntry1] : vesicular lesions near vaginal opening and by urethra.   [FreeTextEntry8] : inguinal lymphadenopathy

## 2024-07-24 NOTE — HISTORY OF PRESENT ILLNESS
[Patient reported PAP Smear was abnormal] : Patient reported PAP Smear was abnormal [N] : Patient reports normal menses [Oral Contraceptive] : uses oral contraception pills [Y] : Positive pregnancy history [Menarche Age: ____] : age at menarche was [unfilled] [No] : Patient does not have concerns regarding sex [Previously active] : previously active [Men] : men [TextBox_4] : Denice is here for c/o vaginal burning for the last few days. she had a [place]yr[place] was about a week ago, she initially had redness from the wax, then felt better. She had sex two days after the wax, and then a couple days later started to have swelling, burning, redness, fever of 100.9F last night. She has been taking tylenol and motrin for the fever.  She feels that she cannot sit due to the pain.  She denies urinary retention, but she is having significant burning with urination.   [Mammogramdate] : n/a [BreastSonogramDate] : n/a [PapSmeardate] : 06/21/2023 [TextBox_31] : asc-us [ColonoscopyDate] : 2023 [TextBox_43] : wnl per pt  [HIVDate] : 05/03/2022 [TextBox_53] : neg  [SyphilisDate] : 05/03/2022 [TextBox_58] : neg  [GonorrheaDate] : 06/21/2023 [TextBox_63] : neg  [ChlamydiaDate] : 06/21/2023 [TextBox_68] : neg [TrichomonasDate] : 06/21/2023 [TextBox_73] : neg  [HPVDate] : 06/21/2023 [TextBox_78] : detected, 16&18/45neg  [LMPDate] : 07/13/2024 [PGHxTotal] : 3 [[facility]] : 0 [PGHxABInduced] : 1 [PGHxABSpont] : 2 [FreeTextEntry1] : 07/13/2024

## 2024-07-25 ENCOUNTER — NON-APPOINTMENT (OUTPATIENT)
Age: 29
End: 2024-07-25

## 2024-07-26 ENCOUNTER — TRANSCRIPTION ENCOUNTER (OUTPATIENT)
Age: 29
End: 2024-07-26

## 2024-07-26 LAB
BACTERIA UR CULT: NORMAL
HSV+VZV DNA SPEC QL NAA+PROBE: ABNORMAL
SPECIMEN SOURCE: NORMAL

## 2024-07-29 ENCOUNTER — NON-APPOINTMENT (OUTPATIENT)
Age: 29
End: 2024-07-29

## 2024-07-29 ENCOUNTER — APPOINTMENT (OUTPATIENT)
Dept: OBGYN | Facility: CLINIC | Age: 29
End: 2024-07-29
Payer: COMMERCIAL

## 2024-07-29 ENCOUNTER — EMERGENCY (EMERGENCY)
Facility: HOSPITAL | Age: 29
LOS: 0 days | Discharge: HOME | End: 2024-07-29
Attending: EMERGENCY MEDICINE
Payer: COMMERCIAL

## 2024-07-29 VITALS
OXYGEN SATURATION: 100 % | HEART RATE: 60 BPM | DIASTOLIC BLOOD PRESSURE: 76 MMHG | SYSTOLIC BLOOD PRESSURE: 117 MMHG | RESPIRATION RATE: 18 BRPM

## 2024-07-29 VITALS
OXYGEN SATURATION: 99 % | DIASTOLIC BLOOD PRESSURE: 57 MMHG | RESPIRATION RATE: 18 BRPM | SYSTOLIC BLOOD PRESSURE: 106 MMHG | WEIGHT: 113.98 LBS | TEMPERATURE: 98 F | HEART RATE: 90 BPM

## 2024-07-29 VITALS
BODY MASS INDEX: 24.84 KG/M2 | WEIGHT: 135 LBS | HEIGHT: 62 IN | SYSTOLIC BLOOD PRESSURE: 114 MMHG | DIASTOLIC BLOOD PRESSURE: 70 MMHG

## 2024-07-29 DIAGNOSIS — Z88.8 ALLERGY STATUS TO OTHER DRUGS, MEDICAMENTS AND BIOLOGICAL SUBSTANCES: ICD-10-CM

## 2024-07-29 DIAGNOSIS — A60.00 HERPESVIRAL INFECTION OF UROGENITAL SYSTEM, UNSPECIFIED: ICD-10-CM

## 2024-07-29 DIAGNOSIS — Z88.2 ALLERGY STATUS TO SULFONAMIDES: ICD-10-CM

## 2024-07-29 DIAGNOSIS — Z88.1 ALLERGY STATUS TO OTHER ANTIBIOTIC AGENTS: ICD-10-CM

## 2024-07-29 DIAGNOSIS — R33.9 RETENTION OF URINE, UNSPECIFIED: ICD-10-CM

## 2024-07-29 LAB
ALBUMIN SERPL ELPH-MCNC: 3.8 G/DL — SIGNIFICANT CHANGE UP (ref 3.3–5)
ALP SERPL-CCNC: 73 U/L — SIGNIFICANT CHANGE UP (ref 40–120)
ALT FLD-CCNC: 18 U/L — SIGNIFICANT CHANGE UP (ref 12–78)
ANION GAP SERPL CALC-SCNC: 3 MMOL/L — LOW (ref 5–17)
APPEARANCE UR: ABNORMAL
AST SERPL-CCNC: 19 U/L — SIGNIFICANT CHANGE UP (ref 15–37)
BACTERIA # UR AUTO: ABNORMAL /HPF
BASOPHILS # BLD AUTO: 0.05 K/UL — SIGNIFICANT CHANGE UP (ref 0–0.2)
BASOPHILS NFR BLD AUTO: 0.9 % — SIGNIFICANT CHANGE UP (ref 0–2)
BILIRUB SERPL-MCNC: 0.3 MG/DL — SIGNIFICANT CHANGE UP (ref 0.2–1.2)
BILIRUB UR-MCNC: NEGATIVE — SIGNIFICANT CHANGE UP
BUN SERPL-MCNC: 10 MG/DL — SIGNIFICANT CHANGE UP (ref 7–23)
CALCIUM SERPL-MCNC: 9.1 MG/DL — SIGNIFICANT CHANGE UP (ref 8.5–10.1)
CAST: 0 /LPF — SIGNIFICANT CHANGE UP (ref 0–4)
CHLORIDE SERPL-SCNC: 111 MMOL/L — HIGH (ref 96–108)
CO2 SERPL-SCNC: 26 MMOL/L — SIGNIFICANT CHANGE UP (ref 22–31)
COLOR SPEC: SIGNIFICANT CHANGE UP
CREAT SERPL-MCNC: 0.68 MG/DL — SIGNIFICANT CHANGE UP (ref 0.5–1.3)
DIFF PNL FLD: ABNORMAL
EGFR: 122 ML/MIN/1.73M2 — SIGNIFICANT CHANGE UP
EOSINOPHIL # BLD AUTO: 0.07 K/UL — SIGNIFICANT CHANGE UP (ref 0–0.5)
EOSINOPHIL NFR BLD AUTO: 1.2 % — SIGNIFICANT CHANGE UP (ref 0–6)
GLUCOSE SERPL-MCNC: 84 MG/DL — SIGNIFICANT CHANGE UP (ref 70–99)
GLUCOSE UR QL: NEGATIVE MG/DL — SIGNIFICANT CHANGE UP
HCG SERPL-ACNC: <1 MIU/ML — SIGNIFICANT CHANGE UP
HCT VFR BLD CALC: 36 % — SIGNIFICANT CHANGE UP (ref 34.5–45)
HGB BLD-MCNC: 12.4 G/DL — SIGNIFICANT CHANGE UP (ref 11.5–15.5)
IMM GRANULOCYTES NFR BLD AUTO: 0.2 % — SIGNIFICANT CHANGE UP (ref 0–0.9)
KETONES UR-MCNC: NEGATIVE MG/DL — SIGNIFICANT CHANGE UP
LEUKOCYTE ESTERASE UR-ACNC: ABNORMAL
LYMPHOCYTES # BLD AUTO: 2.78 K/UL — SIGNIFICANT CHANGE UP (ref 1–3.3)
LYMPHOCYTES # BLD AUTO: 48.5 % — HIGH (ref 13–44)
MAGNESIUM SERPL-MCNC: 1.9 MG/DL — SIGNIFICANT CHANGE UP (ref 1.6–2.6)
MCHC RBC-ENTMCNC: 31.7 PG — SIGNIFICANT CHANGE UP (ref 27–34)
MCHC RBC-ENTMCNC: 34.4 GM/DL — SIGNIFICANT CHANGE UP (ref 32–36)
MCV RBC AUTO: 92.1 FL — SIGNIFICANT CHANGE UP (ref 80–100)
MONOCYTES # BLD AUTO: 0.33 K/UL — SIGNIFICANT CHANGE UP (ref 0–0.9)
MONOCYTES NFR BLD AUTO: 5.8 % — SIGNIFICANT CHANGE UP (ref 2–14)
NEUTROPHILS # BLD AUTO: 2.49 K/UL — SIGNIFICANT CHANGE UP (ref 1.8–7.4)
NEUTROPHILS NFR BLD AUTO: 43.4 % — SIGNIFICANT CHANGE UP (ref 43–77)
NITRITE UR-MCNC: NEGATIVE — SIGNIFICANT CHANGE UP
PH UR: 7.5 — SIGNIFICANT CHANGE UP (ref 5–8)
PLATELET # BLD AUTO: 257 K/UL — SIGNIFICANT CHANGE UP (ref 150–400)
POTASSIUM SERPL-MCNC: 4 MMOL/L — SIGNIFICANT CHANGE UP (ref 3.5–5.3)
POTASSIUM SERPL-SCNC: 4 MMOL/L — SIGNIFICANT CHANGE UP (ref 3.5–5.3)
PROT SERPL-MCNC: 7.3 GM/DL — SIGNIFICANT CHANGE UP (ref 6–8.3)
PROT UR-MCNC: 30 MG/DL
RBC # BLD: 3.91 M/UL — SIGNIFICANT CHANGE UP (ref 3.8–5.2)
RBC # FLD: 12.1 % — SIGNIFICANT CHANGE UP (ref 10.3–14.5)
RBC CASTS # UR COMP ASSIST: 4 /HPF — SIGNIFICANT CHANGE UP (ref 0–4)
SODIUM SERPL-SCNC: 140 MMOL/L — SIGNIFICANT CHANGE UP (ref 135–145)
SP GR SPEC: 1.01 — SIGNIFICANT CHANGE UP (ref 1–1.03)
SQUAMOUS # UR AUTO: >36 /HPF — HIGH (ref 0–5)
UROBILINOGEN FLD QL: 1 MG/DL — SIGNIFICANT CHANGE UP (ref 0.2–1)
WBC # BLD: 5.73 K/UL — SIGNIFICANT CHANGE UP (ref 3.8–10.5)
WBC # FLD AUTO: 5.73 K/UL — SIGNIFICANT CHANGE UP (ref 3.8–10.5)
WBC UR QL: 661 /HPF — HIGH (ref 0–5)

## 2024-07-29 PROCEDURE — 80053 COMPREHEN METABOLIC PANEL: CPT

## 2024-07-29 PROCEDURE — 96375 TX/PRO/DX INJ NEW DRUG ADDON: CPT

## 2024-07-29 PROCEDURE — 85025 COMPLETE CBC W/AUTO DIFF WBC: CPT

## 2024-07-29 PROCEDURE — 99213 OFFICE O/P EST LOW 20 MIN: CPT

## 2024-07-29 PROCEDURE — 99284 EMERGENCY DEPT VISIT MOD MDM: CPT | Mod: 25

## 2024-07-29 PROCEDURE — 99459 PELVIC EXAMINATION: CPT

## 2024-07-29 PROCEDURE — 36415 COLL VENOUS BLD VENIPUNCTURE: CPT

## 2024-07-29 PROCEDURE — 96374 THER/PROPH/DIAG INJ IV PUSH: CPT

## 2024-07-29 PROCEDURE — 81001 URINALYSIS AUTO W/SCOPE: CPT

## 2024-07-29 PROCEDURE — 83735 ASSAY OF MAGNESIUM: CPT

## 2024-07-29 PROCEDURE — 87086 URINE CULTURE/COLONY COUNT: CPT

## 2024-07-29 PROCEDURE — 84702 CHORIONIC GONADOTROPIN TEST: CPT

## 2024-07-29 PROCEDURE — 99284 EMERGENCY DEPT VISIT MOD MDM: CPT

## 2024-07-29 RX ORDER — OXYCODONE AND ACETAMINOPHEN 5; 325 MG/1; MG/1
1 TABLET ORAL
Qty: 16 | Refills: 0
Start: 2024-07-29 | End: 2024-08-01

## 2024-07-29 RX ORDER — MORPHINE SULFATE 100 MG/1
4 TABLET, EXTENDED RELEASE ORAL ONCE
Refills: 0 | Status: DISCONTINUED | OUTPATIENT
Start: 2024-07-29 | End: 2024-07-29

## 2024-07-29 RX ORDER — CEFTRIAXONE SODIUM 500 MG
1000 VIAL (EA) INJECTION ONCE
Refills: 0 | Status: COMPLETED | OUTPATIENT
Start: 2024-07-29 | End: 2024-07-29

## 2024-07-29 RX ORDER — ACYCLOVIR SODIUM 50 MG/ML
500 VIAL (ML) INTRAVENOUS ONCE
Refills: 0 | Status: COMPLETED | OUTPATIENT
Start: 2024-07-29 | End: 2024-07-29

## 2024-07-29 RX ORDER — ACYCLOVIR SODIUM 50 MG/ML
500 VIAL (ML) INTRAVENOUS EVERY 8 HOURS
Refills: 0 | Status: DISCONTINUED | OUTPATIENT
Start: 2024-07-30 | End: 2024-07-29

## 2024-07-29 RX ORDER — ACETAMINOPHEN 325 MG
1000 TABLET ORAL ONCE
Refills: 0 | Status: COMPLETED | OUTPATIENT
Start: 2024-07-29 | End: 2024-07-29

## 2024-07-29 RX ORDER — CEFTRIAXONE SODIUM 500 MG
1000 VIAL (EA) INJECTION ONCE
Refills: 0 | Status: DISCONTINUED | OUTPATIENT
Start: 2024-07-29 | End: 2024-07-29

## 2024-07-29 RX ORDER — ONDANSETRON HYDROCHLORIDE 2 MG/ML
4 INJECTION INTRAMUSCULAR; INTRAVENOUS ONCE
Refills: 0 | Status: COMPLETED | OUTPATIENT
Start: 2024-07-29 | End: 2024-07-29

## 2024-07-29 RX ORDER — ACYCLOVIR SODIUM 50 MG/ML
VIAL (ML) INTRAVENOUS
Refills: 0 | Status: DISCONTINUED | OUTPATIENT
Start: 2024-07-29 | End: 2024-07-29

## 2024-07-29 RX ADMIN — Medication 400 MILLIGRAM(S): at 20:18

## 2024-07-29 RX ADMIN — Medication 260 MILLIGRAM(S): at 19:15

## 2024-07-29 RX ADMIN — Medication 1000 MILLIGRAM(S): at 19:15

## 2024-07-29 RX ADMIN — ONDANSETRON HYDROCHLORIDE 4 MILLIGRAM(S): 2 INJECTION INTRAMUSCULAR; INTRAVENOUS at 18:36

## 2024-07-29 RX ADMIN — MORPHINE SULFATE 4 MILLIGRAM(S): 100 TABLET, EXTENDED RELEASE ORAL at 18:37

## 2024-07-29 NOTE — ED STATDOCS - PROGRESS NOTE DETAILS
Jackie Roldan  30 y/o F presents to the ED sent by OBGYN for Valacyclovir for HSV-1. Has been taking 4g of Valacyclovir per day for the past 5 days without relief. C/o trouble voiding due to the pain. States it takes approx. 45 minutes to urinate. Pt has been taking Robaxin and Tramadol without relief.

## 2024-07-29 NOTE — ED PROVIDER NOTE - NSFOLLOWUPINSTRUCTIONS_ED_ALL_ED_FT
Take percocet if needed, as prescribed for pain.      May also try advil or tramadol as directed on the label for pain.      Continue antibiotic and antiviral medicines.      Follow-up with your obgyn as scheduled Thursday.      Return to ER if needed, any concerns.

## 2024-07-29 NOTE — ED ADULT NURSE REASSESSMENT NOTE - NS ED NURSE REASSESS COMMENT FT1
Received report from FENG Marinelli. Pt awake and alert, AO x 4. Respirations even and unlabored. Cll bell in reach. Safety and comfort measures maintained.

## 2024-07-29 NOTE — HISTORY OF PRESENT ILLNESS
[Oral Contraceptive] : uses oral contraception pills [N] : Patient is not sexually active [Y] : Positive pregnancy history [Menarche Age: ____] : age at menarche was [unfilled] [No] : Patient does not have concerns regarding sex [Previously active] : previously active [Men] : men [PapSmeardate] : 06/21/23 [TextBox_31] : ASCUS [HPVDate] : 06/21/23 [TextBox_78] : POS  [LMPDate] : 07/13/24 [PGHxTotal] : 3 [PGHxABInduced] : 1 [PGHxABSpont] : 2 [FreeTextEntry1] : 07/13/24

## 2024-07-29 NOTE — PLAN
[FreeTextEntry1] : -Advised she go to the ER immediately to be treated for pain, likely Dunn until she is able to void comfortably and IV antiviral treatment  -Her mother will take her to [facility] ED now, called in notification to ER triage  -Keep scheduled f/u apt. Thursday, may change date of return visit pending hospital course if need be -Call or RTO PRN for any new problems, questions or concerns

## 2024-07-29 NOTE — ED PROVIDER NOTE - CLINICAL SUMMARY MEDICAL DECISION MAKING FREE TEXT BOX
pt sent in for iv antivirals due to severe genital herpes pain and urination difficulties. bladder scan in ed showed 25cc urine, not in retention. plan for labs, iv pain meds, iv acycolovir. obs and reeval.

## 2024-07-29 NOTE — ED PROVIDER NOTE - PHYSICAL EXAMINATION
Constitutional: NAD AOx3  Eyes: PERRL EOMI  Head: Normocephalic atraumatic  Mouth: MMM  Cardiac: regular rate and rhythm  Resp: Lungs CTAB  GI: Abd s/nd/nt  : limited 2/2 pain, scattered vesicular lesions, internal vagina and near urethra  Neuro: CN2-12 grossly intact, BALDWIN x 4

## 2024-07-29 NOTE — PHYSICAL EXAM
[Chaperone Present] : A chaperone was present in the examining room during all aspects of the physical examination [23892] : A chaperone was present during the pelvic exam. [Appropriately responsive] : appropriately responsive [Alert] : alert [Oriented x3] : oriented x3 [FreeTextEntry2] : Tearful, in acute pain  [FreeTextEntry1] : Limited due to extreme pain with any manipulation of the vulva, multiple lesions visible to medial labia majora

## 2024-07-29 NOTE — ED ADULT NURSE NOTE - OBJECTIVE STATEMENT
patient ambulatory to ED c/o severe genital pain.  sent by OBGYN.  Pt dx'ed last week with HSV -1 when she developed pain and blisters after being waxed, started on antiviral and antibiotic keflex for uti Friday 7/26/24, 1gm BID of valtrex initially, the pt still with severe pain , especially with urination, Increased to 2gm bid, yesterday and added lidocaine topical. pt states she still has unbearable pain when she urinates.

## 2024-07-29 NOTE — REVIEW OF SYSTEMS
[Patient Intake Form Reviewed] : Patient intake form was reviewed [FreeTextEntry8] : Severe vulvar pain 2/2 HSV outbreak, difficulty urinating

## 2024-07-29 NOTE — ED ADULT NURSE NOTE - CHIEF COMPLAINT QUOTE
pt presents to the ED day 5 of HSV. having trouble voiding. called ahead by Stacy LILLY from [facility]. pt having current urinary retention and difficulty voiding. taking antiviral as prescribed wihtout help. pt is in wheelchair and reports feeling too weak to walk. no other complaints or discomforts

## 2024-07-29 NOTE — ED ADULT NURSE NOTE - NSFALLUNIVINTERV_ED_ALL_ED
Bed/Stretcher in lowest position, wheels locked, appropriate side rails in place/Call bell, personal items and telephone in reach/Instruct patient to call for assistance before getting out of bed/chair/stretcher/Non-slip footwear applied when patient is off stretcher/[place] to call system/Physically safe environment - no spills, clutter or unnecessary equipment/Purposeful proactive rounding/Room/bathroom lighting operational, light cord in reach

## 2024-07-29 NOTE — ED PROVIDER NOTE - PATIENT PORTAL LINK FT
You can access the FollowMyHealth Patient Portal offered by [facility] by registering at the following website: [email or web address]. By joining [employer]’s FollowMyHealth portal, you will also be able to view your health information using other applications (apps) compatible with our system.

## 2024-07-29 NOTE — ED PROVIDER NOTE - OBJECTIVE STATEMENT
30 yo F from home sent in from her OBGYN for pain from genital herpes. Pt dx'ed last week, started on antiviral Friday 7/26/24, 1gm BID of valtrex, still with severe pain , especially with urination, Inc to 2gm bid, yesterday and added lidocaine topical. pt states she still has unbearable pain when she urinates. she's tried soaking in a bathtub as well. took otc tylenol w/o relief. also took tramadol w/o relief. 28 yo F from home sent in from her OBGYN for pain from genital herpes. Pt dx'ed last week with HSV -1 when she developed pain and blisters after being waxed, started on antiviral and antibiotic keflex for uti Friday 7/26/24, 1gm BID of valtrex initially, the pt still with severe pain , especially with urination, Increased to 2gm bid, yesterday and added lidocaine topical. pt states she still has unbearable pain when she urinates. she's tried soaking in a bathtub as well. took otc tylenol w/o relief. also took tramadol and robaxin w/o relief.

## 2024-07-29 NOTE — ED ADULT TRIAGE NOTE - NSWEIGHTCALCTOOLDRUG_GEN_A_CORE
used Post-Care Instructions: I reviewed with the patient in detail post-care instructions. Patient is to keep the biopsy site dry overnight, and then apply bacitracin twice daily until healed. Patient may apply hydrogen peroxide soaks to remove any crusting.

## 2024-07-29 NOTE — ED PROVIDER NOTE - PROGRESS NOTE DETAILS
d/w pt and sig other at bedside, urine is a dirty specimen, already on abx, will await culture before changing antibiotic. MD CIARA pt able to urinate twice , feelign much better. plan for dc home. has f/u with her ob gyn SE.MD

## 2024-07-31 ENCOUNTER — NON-APPOINTMENT (OUTPATIENT)
Age: 29
End: 2024-07-31

## 2024-07-31 DIAGNOSIS — R10.2 PELVIC AND PERINEAL PAIN: ICD-10-CM

## 2024-07-31 DIAGNOSIS — R30.0 DYSURIA: ICD-10-CM

## 2024-07-31 LAB
A VAGINAE DNA VAG QL NAA+PROBE: NORMAL
BVAB2 DNA VAG QL NAA+PROBE: NORMAL
C KRUSEI DNA VAG QL NAA+PROBE: NEGATIVE
C TRACH RRNA SPEC QL NAA+PROBE: NEGATIVE
CANDIDA DNA VAG QL NAA+PROBE: NEGATIVE
CULTURE RESULTS: SIGNIFICANT CHANGE UP
MEGA1 DNA VAG QL NAA+PROBE: NORMAL
N GONORRHOEA RRNA SPEC QL NAA+PROBE: NEGATIVE
SPECIMEN SOURCE: SIGNIFICANT CHANGE UP
T VAGINALIS RRNA SPEC QL NAA+PROBE: NEGATIVE

## 2024-07-31 RX ORDER — VALACYCLOVIR 1 G/1
1 TABLET, FILM COATED ORAL
Qty: 14 | Refills: 1 | Status: ACTIVE | COMMUNITY
Start: 2024-07-24 | End: 1900-01-01

## 2024-08-01 ENCOUNTER — APPOINTMENT (OUTPATIENT)
Dept: OBGYN | Facility: CLINIC | Age: 29
End: 2024-08-01
Payer: COMMERCIAL

## 2024-08-01 VITALS
SYSTOLIC BLOOD PRESSURE: 110 MMHG | HEIGHT: 62 IN | DIASTOLIC BLOOD PRESSURE: 68 MMHG | WEIGHT: 130 LBS | BODY MASS INDEX: 23.92 KG/M2

## 2024-08-01 DIAGNOSIS — L29.2 PRURITUS VULVAE: ICD-10-CM

## 2024-08-01 DIAGNOSIS — B00.9 HERPESVIRAL INFECTION, UNSPECIFIED: ICD-10-CM

## 2024-08-01 PROCEDURE — 99213 OFFICE O/P EST LOW 20 MIN: CPT

## 2024-08-01 PROCEDURE — 99459 PELVIC EXAMINATION: CPT

## 2024-08-01 RX ORDER — CEPHALEXIN 500 MG/1
500 CAPSULE ORAL
Qty: 14 | Refills: 0 | Status: DISCONTINUED | COMMUNITY
Start: 2024-07-24 | End: 2024-08-01

## 2024-08-01 RX ORDER — FLUCONAZOLE 150 MG/1
150 TABLET ORAL
Qty: 2 | Refills: 0 | Status: ACTIVE | COMMUNITY
Start: 2024-08-01 | End: 1900-01-01

## 2024-08-01 NOTE — HISTORY OF PRESENT ILLNESS
[N] : Patient reports normal menses [Oral Contraceptive] : uses oral contraception pills [Y] : Positive pregnancy history [Menarche Age: ____] : age at menarche was [unfilled] [No] : Patient does not have concerns regarding sex [Men] : men [Currently Active] : currently active [PapSmeardate] : 06/21/23 [TextBox_31] : ASCUS [GonorrheaDate] : 06/21/23 [TextBox_63] : negative [ChlamydiaDate] : 06/21/23 [TextBox_68] : negative [HPVDate] : 06/21/23 [TextBox_78] : positive [LMPDate] : 07/13/24 [PGHxTotal] : 3 [[facility] [PGHxABInduced] : 1 [PGHxABSpont] : 2 [FreeTextEntry1] : 07/13/24

## 2024-08-01 NOTE — PHYSICAL EXAM
[FreeTextEntry2] : lesions still present to bilateral medial labia majora and minora, scant white discharge  [FreeTextEntry4] : lesions at the introitus, unable to tolerate speculum exam

## 2024-08-08 LAB
A VAGINAE DNA VAG QL NAA+PROBE: NORMAL
BVAB2 DNA VAG QL NAA+PROBE: NORMAL
C KRUSEI DNA VAG QL NAA+PROBE: NEGATIVE
CANDIDA DNA VAG QL NAA+PROBE: NEGATIVE
MEGA1 DNA VAG QL NAA+PROBE: NORMAL
T VAGINALIS RRNA SPEC QL NAA+PROBE: NEGATIVE

## 2024-11-06 ENCOUNTER — APPOINTMENT (OUTPATIENT)
Dept: OBGYN | Facility: CLINIC | Age: 29
End: 2024-11-06
Payer: COMMERCIAL

## 2024-11-06 VITALS
HEIGHT: 62 IN | DIASTOLIC BLOOD PRESSURE: 70 MMHG | BODY MASS INDEX: 23 KG/M2 | WEIGHT: 125 LBS | SYSTOLIC BLOOD PRESSURE: 110 MMHG

## 2024-11-06 DIAGNOSIS — N94.89 OTHER SPECIFIED CONDITIONS ASSOCIATED WITH FEMALE GENITAL ORGANS AND MENSTRUAL CYCLE: ICD-10-CM

## 2024-11-06 DIAGNOSIS — R35.0 FREQUENCY OF MICTURITION: ICD-10-CM

## 2024-11-06 PROCEDURE — 99213 OFFICE O/P EST LOW 20 MIN: CPT

## 2024-11-06 PROCEDURE — 99459 PELVIC EXAMINATION: CPT

## 2024-11-07 ENCOUNTER — NON-APPOINTMENT (OUTPATIENT)
Age: 29
End: 2024-11-07

## 2024-11-08 ENCOUNTER — TRANSCRIPTION ENCOUNTER (OUTPATIENT)
Age: 29
End: 2024-11-08

## 2024-11-08 LAB
BACTERIA UR CULT: NORMAL
BV BACTERIA RRNA VAG QL NAA+PROBE: NOT DETECTED
C GLABRATA RNA VAG QL NAA+PROBE: NOT DETECTED
CANDIDA RRNA VAG QL PROBE: DETECTED
T VAGINALIS RRNA SPEC QL NAA+PROBE: NOT DETECTED

## 2024-11-12 RX ORDER — FLUCONAZOLE 150 MG/1
150 TABLET ORAL
Qty: 2 | Refills: 1 | Status: ACTIVE | COMMUNITY
Start: 2024-11-07 | End: 1900-01-01

## 2024-11-12 RX ORDER — VALACYCLOVIR 1 G/1
1 TABLET, FILM COATED ORAL
Qty: 90 | Refills: 0 | Status: ACTIVE | COMMUNITY
Start: 2024-11-06 | End: 1900-01-01

## 2024-11-20 ENCOUNTER — APPOINTMENT (OUTPATIENT)
Dept: UROLOGY | Facility: CLINIC | Age: 29
End: 2024-11-20
Payer: COMMERCIAL

## 2024-11-20 VITALS
DIASTOLIC BLOOD PRESSURE: 74 MMHG | WEIGHT: 130 LBS | BODY MASS INDEX: 23.92 KG/M2 | SYSTOLIC BLOOD PRESSURE: 109 MMHG | HEIGHT: 62 IN | HEART RATE: 72 BPM

## 2024-11-20 DIAGNOSIS — N39.0 URINARY TRACT INFECTION, SITE NOT SPECIFIED: ICD-10-CM

## 2024-11-20 DIAGNOSIS — M62.89 OTHER SPECIFIED DISORDERS OF MUSCLE: ICD-10-CM

## 2024-11-20 DIAGNOSIS — R30.0 DYSURIA: ICD-10-CM

## 2024-11-20 PROCEDURE — 99204 OFFICE O/P NEW MOD 45 MIN: CPT

## 2024-11-20 RX ORDER — NITROFURANTOIN MACROCRYSTALS 50 MG/1
50 CAPSULE ORAL
Qty: 90 | Refills: 0 | Status: ACTIVE | COMMUNITY
Start: 2024-11-20 | End: 1900-01-01

## 2024-11-20 RX ORDER — DIAZEPAM 10 MG/1
10 TABLET ORAL
Qty: 30 | Refills: 3 | Status: ACTIVE | COMMUNITY
Start: 2024-11-20 | End: 1900-01-01

## 2024-11-21 LAB
APPEARANCE: CLEAR
BACTERIA: NEGATIVE /HPF
BILIRUBIN URINE: NEGATIVE
BLOOD URINE: NEGATIVE
CAST: 0 /LPF
COLOR: YELLOW
EPITHELIAL CELLS: 1 /HPF
GLUCOSE QUALITATIVE U: NEGATIVE MG/DL
KETONES URINE: NEGATIVE MG/DL
LEUKOCYTE ESTERASE URINE: NEGATIVE
MICROSCOPIC-UA: NORMAL
NITRITE URINE: NEGATIVE
PH URINE: 8
PROTEIN URINE: NEGATIVE MG/DL
RED BLOOD CELLS URINE: 2 /HPF
SPECIFIC GRAVITY URINE: 1.02
UROBILINOGEN URINE: 1 MG/DL
WHITE BLOOD CELLS URINE: 0 /HPF

## 2024-11-22 LAB — BACTERIA UR CULT: NORMAL

## 2024-12-27 ENCOUNTER — APPOINTMENT (OUTPATIENT)
Dept: OBGYN | Facility: CLINIC | Age: 29
End: 2024-12-27
Payer: COMMERCIAL

## 2024-12-27 VITALS
WEIGHT: 130 LBS | DIASTOLIC BLOOD PRESSURE: 60 MMHG | BODY MASS INDEX: 23.92 KG/M2 | HEIGHT: 62 IN | SYSTOLIC BLOOD PRESSURE: 104 MMHG

## 2024-12-27 DIAGNOSIS — Z12.4 ENCOUNTER FOR SCREENING FOR MALIGNANT NEOPLASM OF CERVIX: ICD-10-CM

## 2024-12-27 DIAGNOSIS — N94.89 OTHER SPECIFIED CONDITIONS ASSOCIATED WITH FEMALE GENITAL ORGANS AND MENSTRUAL CYCLE: ICD-10-CM

## 2024-12-27 DIAGNOSIS — Z11.3 ENCOUNTER FOR SCREENING FOR INFECTIONS WITH A PREDOMINANTLY SEXUAL MODE OF TRANSMISSION: ICD-10-CM

## 2024-12-27 LAB
APPEARANCE: CLEAR
BILIRUBIN URINE: NEGATIVE
BLOOD URINE: ABNORMAL
COLOR: YELLOW
GLUCOSE QUALITATIVE U: NEGATIVE
KETONES URINE: NEGATIVE
LEUKOCYTE ESTERASE URINE: NEGATIVE
NITRITE URINE: NEGATIVE
PH URINE: 5.5
PROTEIN URINE: NEGATIVE
SPECIFIC GRAVITY URINE: >=1.03
UROBILINOGEN URINE: 0.2 (ref 0.2–?)

## 2024-12-27 PROCEDURE — 99213 OFFICE O/P EST LOW 20 MIN: CPT

## 2025-01-04 LAB
BV BACTERIA RRNA VAG QL NAA+PROBE: NOT DETECTED
C GLABRATA RNA VAG QL NAA+PROBE: NOT DETECTED
C TRACH RRNA SPEC QL NAA+PROBE: NOT DETECTED
CANDIDA RRNA VAG QL PROBE: NOT DETECTED
CYTOLOGY CVX/VAG DOC THIN PREP: ABNORMAL
HPV HIGH+LOW RISK DNA PNL CVX: NOT DETECTED
N GONORRHOEA RRNA SPEC QL NAA+PROBE: NOT DETECTED
T VAGINALIS RRNA SPEC QL NAA+PROBE: NOT DETECTED

## 2025-01-30 NOTE — ED ADULT TRIAGE NOTE - CHIEF COMPLAINT QUOTE
pt presents to the ED day 5 of HSV. having trouble voiding. called ahead by Stacy LILLY from [facility]. pt having current urinary retention and difficulty voiding. taking antiviral as prescribed wihtout help. pt is in wheelchair and reports feeling too weak to walk. no other complaints or discomforts [Joint Pain] : joint pain [Negative] : Heme/Lymph

## 2025-02-10 ENCOUNTER — APPOINTMENT (OUTPATIENT)
Dept: UROLOGY | Facility: CLINIC | Age: 30
End: 2025-02-10

## 2025-03-05 ENCOUNTER — RX RENEWAL (OUTPATIENT)
Age: 30
End: 2025-03-05

## 2025-05-22 ENCOUNTER — APPOINTMENT (OUTPATIENT)
Dept: OBGYN | Facility: CLINIC | Age: 30
End: 2025-05-22
Payer: COMMERCIAL

## 2025-05-22 DIAGNOSIS — L29.2 PRURITUS VULVAE: ICD-10-CM

## 2025-05-22 DIAGNOSIS — N39.0 URINARY TRACT INFECTION, SITE NOT SPECIFIED: ICD-10-CM

## 2025-05-22 LAB
HCG UR QL: NEGATIVE
QUALITY CONTROL: YES

## 2025-05-22 PROCEDURE — 99459 PELVIC EXAMINATION: CPT

## 2025-05-22 PROCEDURE — 99213 OFFICE O/P EST LOW 20 MIN: CPT

## 2025-05-22 PROCEDURE — 81025 URINE PREGNANCY TEST: CPT

## 2025-05-22 RX ORDER — CEPHALEXIN 500 MG/1
500 TABLET ORAL 3 TIMES DAILY
Qty: 15 | Refills: 0 | Status: ACTIVE | COMMUNITY
Start: 2025-05-22 | End: 1900-01-01

## 2025-05-22 RX ORDER — TERCONAZOLE 8 MG/G
0.8 CREAM VAGINAL
Qty: 1 | Refills: 1 | Status: ACTIVE | COMMUNITY
Start: 2025-05-22 | End: 1900-01-01

## 2025-05-27 DIAGNOSIS — N76.0 ACUTE VAGINITIS: ICD-10-CM

## 2025-05-27 DIAGNOSIS — B96.89 ACUTE VAGINITIS: ICD-10-CM

## 2025-05-27 LAB
BACTERIA UR CULT: NORMAL
BV BACTERIA RRNA VAG QL NAA+PROBE: DETECTED
C GLABRATA RNA VAG QL NAA+PROBE: NOT DETECTED
C TRACH RRNA SPEC QL NAA+PROBE: NOT DETECTED
CANDIDA RRNA VAG QL PROBE: NOT DETECTED
N GONORRHOEA RRNA SPEC QL NAA+PROBE: NOT DETECTED
T VAGINALIS RRNA SPEC QL NAA+PROBE: NOT DETECTED

## 2025-05-27 RX ORDER — METRONIDAZOLE 500 MG/1
500 TABLET ORAL TWICE DAILY
Qty: 10 | Refills: 0 | Status: ACTIVE | COMMUNITY
Start: 2025-05-27 | End: 1900-01-01

## 2025-06-18 ENCOUNTER — APPOINTMENT (OUTPATIENT)
Dept: OBGYN | Facility: CLINIC | Age: 30
End: 2025-06-18
Payer: COMMERCIAL

## 2025-06-18 VITALS
HEIGHT: 62 IN | SYSTOLIC BLOOD PRESSURE: 110 MMHG | DIASTOLIC BLOOD PRESSURE: 70 MMHG | BODY MASS INDEX: 22.45 KG/M2 | WEIGHT: 122 LBS

## 2025-06-18 PROCEDURE — 99395 PREV VISIT EST AGE 18-39: CPT

## 2025-06-18 PROCEDURE — 99459 PELVIC EXAMINATION: CPT

## 2025-06-18 RX ORDER — ESTRADIOL 0.1 MG/G
0.1 CREAM VAGINAL
Qty: 1 | Refills: 0 | Status: ACTIVE | COMMUNITY
Start: 2025-06-18 | End: 1900-01-01

## 2025-06-27 LAB
A VAGINAE DNA VAG QL NAA+PROBE: NORMAL
BVAB2 DNA VAG QL NAA+PROBE: NORMAL
C KRUSEI DNA VAG QL NAA+PROBE: NEGATIVE
C KRUSEI DNA VAG QL NAA+PROBE: POSITIVE
C TRACH RRNA SPEC QL NAA+PROBE: NEGATIVE
CANDIDA DNA VAG QL NAA+PROBE: NEGATIVE
MEGA1 DNA VAG QL NAA+PROBE: NORMAL
N GONORRHOEA RRNA SPEC QL NAA+PROBE: NEGATIVE
T VAGINALIS RRNA SPEC QL NAA+PROBE: NEGATIVE

## 2025-06-28 RX ORDER — TERCONAZOLE 4 MG/G
0.4 CREAM VAGINAL
Qty: 1 | Refills: 0 | Status: ACTIVE | COMMUNITY
Start: 2025-06-27

## 2025-06-28 RX ORDER — FLUCONAZOLE 150 MG/1
150 TABLET ORAL
Qty: 1 | Refills: 0 | Status: ACTIVE | COMMUNITY
Start: 2025-06-27